# Patient Record
Sex: MALE | Race: WHITE | Employment: OTHER | URBAN - METROPOLITAN AREA
[De-identification: names, ages, dates, MRNs, and addresses within clinical notes are randomized per-mention and may not be internally consistent; named-entity substitution may affect disease eponyms.]

---

## 2017-08-23 ENCOUNTER — OFFICE VISIT (OUTPATIENT)
Dept: INTERNAL MEDICINE CLINIC | Age: 57
End: 2017-08-23
Payer: COMMERCIAL

## 2017-08-23 ENCOUNTER — HOSPITAL ENCOUNTER (OUTPATIENT)
Age: 57
Setting detail: SPECIMEN
Discharge: HOME | End: 2017-08-23
Payer: COMMERCIAL

## 2017-08-23 VITALS
BODY MASS INDEX: 32.44 KG/M2 | WEIGHT: 219 LBS | SYSTOLIC BLOOD PRESSURE: 136 MMHG | DIASTOLIC BLOOD PRESSURE: 84 MMHG | HEIGHT: 69 IN

## 2017-08-23 DIAGNOSIS — L30.9 ECZEMA OF BOTH HANDS: ICD-10-CM

## 2017-08-23 LAB
-: ABNORMAL
ABSOLUTE EOS #: 0.2 K/UL (ref 0–0.4)
ABSOLUTE LYMPH #: 1.7 K/UL (ref 1–4.8)
ABSOLUTE MONO #: 0.9 K/UL (ref 0.1–1.2)
ALBUMIN SERPL-MCNC: 4.9 G/DL (ref 3.5–5.2)
ALBUMIN/GLOBULIN RATIO: 1.8 (ref 1–2.5)
ALP BLD-CCNC: 51 U/L (ref 40–129)
ALT SERPL-CCNC: 15 U/L (ref 5–41)
AMORPHOUS: ABNORMAL
ANION GAP SERPL CALCULATED.3IONS-SCNC: 20 MMOL/L (ref 9–17)
AST SERPL-CCNC: 20 U/L
BACTERIA: ABNORMAL
BASOPHILS # BLD: 1 %
BASOPHILS ABSOLUTE: 0 K/UL (ref 0–0.2)
BILIRUB SERPL-MCNC: 0.5 MG/DL (ref 0.3–1.2)
BILIRUBIN URINE: NEGATIVE
BUN BLDV-MCNC: 13 MG/DL (ref 6–20)
BUN/CREAT BLD: ABNORMAL (ref 9–20)
CALCIUM SERPL-MCNC: 9.7 MG/DL (ref 8.6–10.4)
CASTS UA: ABNORMAL /LPF (ref 0–8)
CHLORIDE BLD-SCNC: 102 MMOL/L (ref 98–107)
CO2: 20 MMOL/L (ref 20–31)
COLOR: YELLOW
CREAT SERPL-MCNC: 0.73 MG/DL (ref 0.7–1.2)
CRYSTALS, UA: ABNORMAL /HPF
DIFFERENTIAL TYPE: NORMAL
EOSINOPHILS RELATIVE PERCENT: 2 %
EPITHELIAL CELLS UA: ABNORMAL /HPF (ref 0–5)
GFR AFRICAN AMERICAN: >60 ML/MIN
GFR NON-AFRICAN AMERICAN: >60 ML/MIN
GFR SERPL CREATININE-BSD FRML MDRD: ABNORMAL ML/MIN/{1.73_M2}
GFR SERPL CREATININE-BSD FRML MDRD: ABNORMAL ML/MIN/{1.73_M2}
GLUCOSE BLD-MCNC: 92 MG/DL (ref 70–99)
GLUCOSE URINE: NEGATIVE
HCT VFR BLD CALC: 46.1 % (ref 41–53)
HEMOGLOBIN: 15.9 G/DL (ref 13.5–17.5)
KETONES, URINE: NEGATIVE
LEUKOCYTE ESTERASE, URINE: NEGATIVE
LYMPHOCYTES # BLD: 27 %
MCH RBC QN AUTO: 32.7 PG (ref 26–34)
MCHC RBC AUTO-ENTMCNC: 34.6 G/DL (ref 31–37)
MCV RBC AUTO: 94.5 FL (ref 80–100)
MONOCYTES # BLD: 14 %
MUCUS: ABNORMAL
NITRITE, URINE: NEGATIVE
OTHER OBSERVATIONS UA: ABNORMAL
PDW BLD-RTO: 14.4 % (ref 12.5–15.4)
PH UA: 5.5 (ref 5–8)
PLATELET # BLD: 257 K/UL (ref 140–450)
PLATELET ESTIMATE: NORMAL
PMV BLD AUTO: 8.8 FL (ref 6–12)
POTASSIUM SERPL-SCNC: 4.3 MMOL/L (ref 3.7–5.3)
PROTEIN UA: NEGATIVE
RBC # BLD: 4.88 M/UL (ref 4.5–5.9)
RBC # BLD: NORMAL 10*6/UL
RBC UA: ABNORMAL /HPF (ref 0–4)
RENAL EPITHELIAL, UA: ABNORMAL /HPF
SEDIMENTATION RATE, ERYTHROCYTE: 1 MM (ref 0–10)
SEG NEUTROPHILS: 56 %
SEGMENTED NEUTROPHILS ABSOLUTE COUNT: 3.6 K/UL (ref 1.8–7.7)
SODIUM BLD-SCNC: 142 MMOL/L (ref 135–144)
SPECIFIC GRAVITY UA: 1.02 (ref 1–1.03)
TOTAL PROTEIN: 7.6 G/DL (ref 6.4–8.3)
TRICHOMONAS: ABNORMAL
TSH SERPL DL<=0.05 MIU/L-ACNC: 0.79 MIU/L (ref 0.3–5)
TURBIDITY: ABNORMAL
URINE HGB: NEGATIVE
UROBILINOGEN, URINE: NORMAL
WBC # BLD: 6.3 K/UL (ref 3.5–11)
WBC # BLD: NORMAL 10*3/UL
WBC UA: ABNORMAL /HPF (ref 0–5)
YEAST: ABNORMAL

## 2017-08-23 PROCEDURE — 99214 OFFICE O/P EST MOD 30 MIN: CPT | Performed by: INTERNAL MEDICINE

## 2017-08-23 RX ORDER — CLOBETASOL PROPIONATE 0.5 MG/G
AEROSOL, FOAM TOPICAL
Qty: 1 CAN | Refills: 3 | Status: SHIPPED | OUTPATIENT
Start: 2017-08-23 | End: 2018-02-27 | Stop reason: ALTCHOICE

## 2017-08-23 RX ORDER — PREDNISONE 20 MG/1
20 TABLET ORAL DAILY
Qty: 10 TABLET | Refills: 0 | Status: SHIPPED | OUTPATIENT
Start: 2017-08-23 | End: 2017-09-13 | Stop reason: SDUPTHER

## 2017-08-23 ASSESSMENT — PATIENT HEALTH QUESTIONNAIRE - PHQ9
SUM OF ALL RESPONSES TO PHQ9 QUESTIONS 1 & 2: 0
2. FEELING DOWN, DEPRESSED OR HOPELESS: 0
1. LITTLE INTEREST OR PLEASURE IN DOING THINGS: 0
SUM OF ALL RESPONSES TO PHQ QUESTIONS 1-9: 0

## 2017-08-23 ASSESSMENT — ENCOUNTER SYMPTOMS
EYES NEGATIVE: 1
RESPIRATORY NEGATIVE: 1

## 2017-09-14 RX ORDER — PREDNISONE 20 MG/1
20 TABLET ORAL DAILY
Qty: 10 TABLET | Refills: 2 | Status: SHIPPED | OUTPATIENT
Start: 2017-09-14 | End: 2017-09-24

## 2017-10-03 ENCOUNTER — TELEPHONE (OUTPATIENT)
Dept: INTERNAL MEDICINE CLINIC | Age: 57
End: 2017-10-03

## 2017-10-03 ENCOUNTER — OFFICE VISIT (OUTPATIENT)
Dept: INTERNAL MEDICINE CLINIC | Age: 57
End: 2017-10-03
Payer: COMMERCIAL

## 2017-10-03 VITALS
WEIGHT: 233 LBS | SYSTOLIC BLOOD PRESSURE: 116 MMHG | BODY MASS INDEX: 34.51 KG/M2 | DIASTOLIC BLOOD PRESSURE: 80 MMHG | HEIGHT: 69 IN

## 2017-10-03 DIAGNOSIS — Z12.11 ENCOUNTER FOR SCREENING COLONOSCOPY: ICD-10-CM

## 2017-10-03 DIAGNOSIS — L30.9 ECZEMA OF BOTH HANDS: Primary | ICD-10-CM

## 2017-10-03 PROCEDURE — 99214 OFFICE O/P EST MOD 30 MIN: CPT | Performed by: INTERNAL MEDICINE

## 2017-10-03 PROCEDURE — G8417 CALC BMI ABV UP PARAM F/U: HCPCS | Performed by: INTERNAL MEDICINE

## 2017-10-03 PROCEDURE — G8427 DOCREV CUR MEDS BY ELIG CLIN: HCPCS | Performed by: INTERNAL MEDICINE

## 2017-10-03 PROCEDURE — 4004F PT TOBACCO SCREEN RCVD TLK: CPT | Performed by: INTERNAL MEDICINE

## 2017-10-03 PROCEDURE — G8484 FLU IMMUNIZE NO ADMIN: HCPCS | Performed by: INTERNAL MEDICINE

## 2017-10-03 PROCEDURE — 3017F COLORECTAL CA SCREEN DOC REV: CPT | Performed by: INTERNAL MEDICINE

## 2017-10-03 RX ORDER — PREDNISONE 20 MG/1
TABLET ORAL
COMMUNITY
Start: 2017-09-25 | End: 2018-02-27 | Stop reason: ALTCHOICE

## 2017-10-03 ASSESSMENT — ENCOUNTER SYMPTOMS
RESPIRATORY NEGATIVE: 1
EYES NEGATIVE: 1

## 2017-10-03 NOTE — MR AVS SNAPSHOT
After Visit Summary             Alisa Wilson   10/3/2017 3:30 PM   Office Visit    Description:  Male : 1960   Provider:  Aaliyah Christian MD   Department:  [facility]              Your Follow-Up and Future Appointments         Below is a list of your follow-up and future appointments. This may not be a complete list as you may have made appointments directly with providers that we are not aware of or your providers may have made some for you. Please call your providers to confirm appointments. It is important to keep your appointments. Please bring your current insurance card, photo ID, co-pay, and all medication bottles to your appointment. If self-pay, payment is expected at the time of service. Your To-Do List     Follow-Up    Return for Follow Up. Information from Your Visit        Department     Name Address Phone Fax    [address] [address] [phone]      You Were Seen for:         Comments    Eczema of both hands   [[id number]]         Vital Signs     Blood Pressure Height Weight Body Mass Index Smoking Status       116/80 5' 9.29\" (1.76 m) 233 lb (105.7 kg) 34.12 kg/m2 Current Every Day Smoker       Additional Information about your Body Mass Index (BMI)           Your BMI as listed above is considered obese (30 or more). BMI is an estimate of body fat, calculated from your height and weight. The higher your BMI, the greater your risk of heart disease, high blood pressure, type 2 diabetes, stroke, gallstones, arthritis, sleep apnea, and certain cancers. BMI is not perfect. It may overestimate body fat in athletes and people who are more muscular. Even a small weight loss (between 5 and 10 percent of your current weight) by decreasing your calorie intake and becoming more physically active will help lower your risk of developing or worsening diseases associated with obesity. Learn more at: [employer]             Medications and Orders      Your Current Medications Are              predniSONE (DELTASONE) 20 MG tablet     clobetasol (OLUX) 0.05 % foam Apply topically 2 times daily. Allergies           No Known Allergies      We Ordered/Performed the following           External Referral To Dermatology     Comments: The patient can be scheduled with any member of the group, including the provider with the first available appointments. Kaela Crook MD, [facility] [place]*     Scheduling Instructions:    [facility]- Renée Brantley, [address]  [place], [address] [place] [address]  [phone]    Comments: The patient can be scheduled with any member of the group, including the provider with the first available appointments. Additional Information        Basic Information     Date Of Birth Sex Race Ethnicity Preferred Language Preferred Written Language    1960 Male White Non-/Non  English English      Problem List as of 10/3/2017  Date Reviewed: 10/3/2017                Eczema of both hands      Preventive Care        Date Due    Hepatitis C screening is recommended for all adults regardless of risk factors born between [facility] at least once (lifetime) who have never been tested. 1960    HIV screening is recommended for all people regardless of risk factors  aged 15-65 years at least once (lifetime) who have never been HIV tested.  8/7/1973    Tetanus Combination Vaccine (1 - Tdap) 8/7/1977    Pneumococcal Vaccine - Pneumovax for adults aged 19-64 years with: chronic heart disease, chronic lung disease, diabetes mellitus, alcoholism, chronic liver disease, or cigarette smoking. (1 of 1 - PPSV23) 8/7/1977    Cholesterol Screening 8/7/1998    Colonoscopy 8/7/2008    Yearly Flu Vaccine (1) 9/1/2017            MyChart Signup myeasydocs allows you to send messages to your doctor, view your test results, renew your prescriptions, schedule appointments, view visit notes, and more. How Do I Sign Up? 1. In your Internet browser, go to [email or web address]  2. Click on the Sign Up Now link in the Sign In box. You will see the New Member Sign Up page. 3. Enter your myeasydocs Access Code exactly as it appears below. You will not need to use this code after youve completed the sign-up process. If you do not sign up before the expiration date, you must request a new code. myeasydocs Access Code: [email or web address]  Expires: 10/22/2017  2:02 PM    4. Enter your Social Security Number ([id number]) and Date of Birth (mm/dd/yyyy) as indicated and click Submit. You will be taken to the next sign-up page. 5. Create a myeasydocs ID. This will be your myeasydocs login ID and cannot be changed, so think of one that is secure and easy to remember. 6. Create a myeasydocs password. You can change your password at any time. 7. Enter your Password Reset Question and Answer. This can be used at a later time if you forget your password. 8. Enter your e-mail address. You will receive e-mail notification when new information is available in [address] 9. Click Sign Up. You can now view your medical record. Additional Information  If you have questions, please contact the physician practice where you receive care. Remember, myeasydocs is NOT to be used for urgent needs. For medical emergencies, dial 911. For questions regarding your myeasydocs account call [phone]. If you have a clinical question, please call your doctor's office.

## 2017-10-03 NOTE — TELEPHONE ENCOUNTER
LM on pts phone to find out where pt will be going for external dermatology referral placed by Dr. Jefferson Casarez.

## 2017-10-04 NOTE — TELEPHONE ENCOUNTER
Pt returned my call & informed me Dr. Jovanny Marshall referred him to Dr. Jose Shultz. Referral updated.

## 2018-02-27 ENCOUNTER — TELEPHONE (OUTPATIENT)
Dept: INTERNAL MEDICINE CLINIC | Age: 58
End: 2018-02-27

## 2018-02-27 ENCOUNTER — HOSPITAL ENCOUNTER (OUTPATIENT)
Facility: CLINIC | Age: 58
Discharge: HOME | End: 2018-03-01
Payer: COMMERCIAL

## 2018-02-27 ENCOUNTER — OFFICE VISIT (OUTPATIENT)
Dept: INTERNAL MEDICINE CLINIC | Age: 58
End: 2018-02-27
Payer: COMMERCIAL

## 2018-02-27 ENCOUNTER — HOSPITAL ENCOUNTER (OUTPATIENT)
Dept: GENERAL RADIOLOGY | Facility: CLINIC | Age: 58
Discharge: HOME | End: 2018-03-01
Payer: COMMERCIAL

## 2018-02-27 VITALS
HEART RATE: 71 BPM | OXYGEN SATURATION: 97 % | WEIGHT: 233.03 LBS | BODY MASS INDEX: 34.51 KG/M2 | SYSTOLIC BLOOD PRESSURE: 162 MMHG | HEIGHT: 69 IN | DIASTOLIC BLOOD PRESSURE: 108 MMHG

## 2018-02-27 DIAGNOSIS — R07.89 LEFT-SIDED CHEST WALL PAIN: Primary | ICD-10-CM

## 2018-02-27 DIAGNOSIS — R07.89 LEFT-SIDED CHEST WALL PAIN: ICD-10-CM

## 2018-02-27 PROCEDURE — 99213 OFFICE O/P EST LOW 20 MIN: CPT | Performed by: INTERNAL MEDICINE

## 2018-02-27 PROCEDURE — G8427 DOCREV CUR MEDS BY ELIG CLIN: HCPCS | Performed by: INTERNAL MEDICINE

## 2018-02-27 PROCEDURE — G8484 FLU IMMUNIZE NO ADMIN: HCPCS | Performed by: INTERNAL MEDICINE

## 2018-02-27 PROCEDURE — 3017F COLORECTAL CA SCREEN DOC REV: CPT | Performed by: INTERNAL MEDICINE

## 2018-02-27 PROCEDURE — 71046 X-RAY EXAM CHEST 2 VIEWS: CPT

## 2018-02-27 PROCEDURE — G8417 CALC BMI ABV UP PARAM F/U: HCPCS | Performed by: INTERNAL MEDICINE

## 2018-02-27 PROCEDURE — 4004F PT TOBACCO SCREEN RCVD TLK: CPT | Performed by: INTERNAL MEDICINE

## 2018-02-27 RX ORDER — CLOBETASOL PROPIONATE 0.5 MG/G
OINTMENT TOPICAL 2 TIMES DAILY
COMMUNITY
End: 2019-01-15 | Stop reason: ALTCHOICE

## 2018-02-27 NOTE — PROGRESS NOTES
Subjective:      Patient ID: Ammy Fernández is a 59 y.o. male. Visit Information    Have you changed or started any medications since your last visit including any over-the-counter medicines, vitamins, or herbal medicines? no   Are you having any side effects from any of your medications? -  no  Have you stopped taking any of your medications? Is so, why? -  no    Have you seen any other physician or provider since your last visit? No  Have you had any other diagnostic tests since your last visit? No  Have you been seen in the emergency room and/or had an admission to a hospital since we last saw you? No  Have you had your routine dental cleaning in the past 6 months? no    Have you activated your [employer] account? If not, what are your barriers? No:      Patient Care Team:  Raisa Camacho MD as PCP - General (Internal Medicine)    Medical History Review  Past Medical, Family, and Social History reviewed and does contribute to the patient presenting condition    Health Maintenance   Topic Date Due    Hepatitis C screen  1960    HIV screen  08/07/1973    DTaP/Tdap/Td vaccine (1 - Tdap) 08/07/1977    Pneumococcal med risk (1 of 1 - PPSV23) 08/07/1977    Lipid screen  08/07/1998    Shingles Vaccine (1 of 2 - 2 Dose Series) 08/07/2008    Colon cancer screen colonoscopy  08/07/2008    Smoker: low dose lung CT screening  08/07/2013    Flu vaccine (1) 09/01/2017       HPI   Chief Complaint   Patient presents with    Chest Pain     pt states he fell about 2 weeks ago and hit the left side of his chest on the tailgate of his truck. Still c/o pain in the left side of his chest. Will radiate around to left side of back and under left ar. .     Pt with fall   Left chest wall pain   Not improving since 1 week   Will do CXR for rib fracture hemothorax    Review of Systems   History reviewed. No pertinent past medical history.   Social History   Substance Use Topics    Smoking status: Current Every Day Smoker

## 2019-01-15 ENCOUNTER — OFFICE VISIT (OUTPATIENT)
Dept: INTERNAL MEDICINE CLINIC | Age: 59
End: 2019-01-15
Payer: COMMERCIAL

## 2019-01-15 VITALS
SYSTOLIC BLOOD PRESSURE: 172 MMHG | WEIGHT: 233.03 LBS | DIASTOLIC BLOOD PRESSURE: 92 MMHG | HEIGHT: 69 IN | BODY MASS INDEX: 34.51 KG/M2

## 2019-01-15 DIAGNOSIS — I10 HYPERTENSION, ESSENTIAL: ICD-10-CM

## 2019-01-15 DIAGNOSIS — H61.23 IMPACTED CERUMEN OF BOTH EARS: Primary | ICD-10-CM

## 2019-01-15 DIAGNOSIS — Z13.220 SCREENING FOR HYPERLIPIDEMIA: ICD-10-CM

## 2019-01-15 DIAGNOSIS — Z12.11 SCREEN FOR COLON CANCER: ICD-10-CM

## 2019-01-15 PROCEDURE — G8417 CALC BMI ABV UP PARAM F/U: HCPCS | Performed by: INTERNAL MEDICINE

## 2019-01-15 PROCEDURE — 99214 OFFICE O/P EST MOD 30 MIN: CPT | Performed by: INTERNAL MEDICINE

## 2019-01-15 PROCEDURE — 3017F COLORECTAL CA SCREEN DOC REV: CPT | Performed by: INTERNAL MEDICINE

## 2019-01-15 PROCEDURE — G8427 DOCREV CUR MEDS BY ELIG CLIN: HCPCS | Performed by: INTERNAL MEDICINE

## 2019-01-15 PROCEDURE — 4004F PT TOBACCO SCREEN RCVD TLK: CPT | Performed by: INTERNAL MEDICINE

## 2019-01-15 PROCEDURE — G8484 FLU IMMUNIZE NO ADMIN: HCPCS | Performed by: INTERNAL MEDICINE

## 2019-01-15 RX ORDER — LOSARTAN POTASSIUM 50 MG/1
50 TABLET ORAL DAILY
Qty: 30 TABLET | Refills: 1 | Status: SHIPPED | OUTPATIENT
Start: 2019-01-15 | End: 2019-04-10 | Stop reason: SDUPTHER

## 2019-01-15 ASSESSMENT — PATIENT HEALTH QUESTIONNAIRE - PHQ9
SUM OF ALL RESPONSES TO PHQ QUESTIONS 1-9: 0
SUM OF ALL RESPONSES TO PHQ QUESTIONS 1-9: 0
1. LITTLE INTEREST OR PLEASURE IN DOING THINGS: 0
2. FEELING DOWN, DEPRESSED OR HOPELESS: 0
SUM OF ALL RESPONSES TO PHQ9 QUESTIONS 1 & 2: 0

## 2019-01-15 ASSESSMENT — ENCOUNTER SYMPTOMS
EYES NEGATIVE: 1
RESPIRATORY NEGATIVE: 1

## 2019-02-04 ENCOUNTER — HOSPITAL ENCOUNTER (OUTPATIENT)
Age: 59
Setting detail: SPECIMEN
Discharge: HOME | End: 2019-02-04
Payer: COMMERCIAL

## 2019-02-04 DIAGNOSIS — I10 HYPERTENSION, ESSENTIAL: ICD-10-CM

## 2019-02-04 LAB
-: ABNORMAL
ABSOLUTE EOS #: 0.14 K/UL (ref 0–0.44)
ABSOLUTE IMMATURE GRANULOCYTE: <0.03 K/UL (ref 0–0.3)
ABSOLUTE LYMPH #: 1.32 K/UL (ref 1.1–3.7)
ABSOLUTE MONO #: 0.8 K/UL (ref 0.1–1.2)
ALBUMIN SERPL-MCNC: 4.5 G/DL (ref 3.5–5.2)
ALBUMIN/GLOBULIN RATIO: 1.6 (ref 1–2.5)
ALP BLD-CCNC: 53 U/L (ref 40–129)
ALT SERPL-CCNC: 17 U/L (ref 5–41)
AMORPHOUS: ABNORMAL
ANION GAP SERPL CALCULATED.3IONS-SCNC: 12 MMOL/L (ref 9–17)
AST SERPL-CCNC: 16 U/L
BACTERIA: ABNORMAL
BASOPHILS # BLD: 1 % (ref 0–2)
BASOPHILS ABSOLUTE: 0.08 K/UL (ref 0–0.2)
BILIRUB SERPL-MCNC: 0.4 MG/DL (ref 0.3–1.2)
BILIRUBIN URINE: NEGATIVE
BUN BLDV-MCNC: 17 MG/DL (ref 8–23)
BUN/CREAT BLD: ABNORMAL (ref 9–20)
CALCIUM SERPL-MCNC: 9.8 MG/DL (ref 8.6–10.4)
CASTS UA: ABNORMAL /LPF (ref 0–8)
CHLORIDE BLD-SCNC: 105 MMOL/L (ref 98–107)
CHOLESTEROL/HDL RATIO: 3.7
CHOLESTEROL: 232 MG/DL
CO2: 24 MMOL/L (ref 20–31)
COLOR: YELLOW
CREAT SERPL-MCNC: 0.79 MG/DL (ref 0.7–1.2)
CRYSTALS, UA: ABNORMAL /HPF
DIFFERENTIAL TYPE: ABNORMAL
EOSINOPHILS RELATIVE PERCENT: 3 % (ref 1–4)
EPITHELIAL CELLS UA: ABNORMAL /HPF (ref 0–5)
GFR AFRICAN AMERICAN: >60 ML/MIN
GFR NON-AFRICAN AMERICAN: >60 ML/MIN
GFR SERPL CREATININE-BSD FRML MDRD: ABNORMAL ML/MIN/{1.73_M2}
GFR SERPL CREATININE-BSD FRML MDRD: ABNORMAL ML/MIN/{1.73_M2}
GLUCOSE BLD-MCNC: 103 MG/DL (ref 70–99)
GLUCOSE URINE: NEGATIVE
HCT VFR BLD CALC: 43 % (ref 40.7–50.3)
HDLC SERPL-MCNC: 63 MG/DL
HEMOGLOBIN: 14.7 G/DL (ref 13–17)
IMMATURE GRANULOCYTES: 0 %
KETONES, URINE: NEGATIVE
LDL CHOLESTEROL: 156 MG/DL (ref 0–130)
LEUKOCYTE ESTERASE, URINE: NEGATIVE
LYMPHOCYTES # BLD: 23 % (ref 24–43)
MCH RBC QN AUTO: 32.7 PG (ref 25.2–33.5)
MCHC RBC AUTO-ENTMCNC: 34.2 G/DL (ref 28.4–34.8)
MCV RBC AUTO: 95.6 FL (ref 82.6–102.9)
MONOCYTES # BLD: 14 % (ref 3–12)
MUCUS: ABNORMAL
NITRITE, URINE: NEGATIVE
NRBC AUTOMATED: 0 PER 100 WBC
OTHER OBSERVATIONS UA: ABNORMAL
PDW BLD-RTO: 13.3 % (ref 11.8–14.4)
PH UA: 6 (ref 5–8)
PLATELET # BLD: 269 K/UL (ref 138–453)
PLATELET ESTIMATE: ABNORMAL
PMV BLD AUTO: 9.9 FL (ref 8.1–13.5)
POTASSIUM SERPL-SCNC: 4.5 MMOL/L (ref 3.7–5.3)
PROTEIN UA: NEGATIVE
RBC # BLD: 4.5 M/UL (ref 4.21–5.77)
RBC # BLD: ABNORMAL 10*6/UL
RBC UA: ABNORMAL /HPF (ref 0–4)
RENAL EPITHELIAL, UA: ABNORMAL /HPF
SEDIMENTATION RATE, ERYTHROCYTE: 5 MM (ref 0–10)
SEG NEUTROPHILS: 59 % (ref 36–65)
SEGMENTED NEUTROPHILS ABSOLUTE COUNT: 3.36 K/UL (ref 1.5–8.1)
SODIUM BLD-SCNC: 141 MMOL/L (ref 135–144)
SPECIFIC GRAVITY UA: 1.02 (ref 1–1.03)
TOTAL PROTEIN: 7.4 G/DL (ref 6.4–8.3)
TRICHOMONAS: ABNORMAL
TRIGL SERPL-MCNC: 64 MG/DL
TSH SERPL DL<=0.05 MIU/L-ACNC: 1.54 MIU/L (ref 0.3–5)
TURBIDITY: ABNORMAL
URINE HGB: NEGATIVE
UROBILINOGEN, URINE: NORMAL
VLDLC SERPL CALC-MCNC: ABNORMAL MG/DL (ref 1–30)
WBC # BLD: 5.7 K/UL (ref 3.5–11.3)
WBC # BLD: ABNORMAL 10*3/UL
WBC UA: ABNORMAL /HPF (ref 0–5)
YEAST: ABNORMAL

## 2019-03-27 ENCOUNTER — OFFICE VISIT (OUTPATIENT)
Dept: INTERNAL MEDICINE CLINIC | Age: 59
End: 2019-03-27
Payer: COMMERCIAL

## 2019-03-27 VITALS
SYSTOLIC BLOOD PRESSURE: 124 MMHG | OXYGEN SATURATION: 98 % | HEART RATE: 104 BPM | DIASTOLIC BLOOD PRESSURE: 82 MMHG | HEIGHT: 69 IN | WEIGHT: 232 LBS | BODY MASS INDEX: 34.36 KG/M2

## 2019-03-27 DIAGNOSIS — R10.13 EPIGASTRIC ABDOMINAL PAIN: Primary | ICD-10-CM

## 2019-03-27 DIAGNOSIS — K08.89 PAIN, DENTAL: ICD-10-CM

## 2019-03-27 DIAGNOSIS — R13.10 ODYNOPHAGIA: ICD-10-CM

## 2019-03-27 PROCEDURE — G8427 DOCREV CUR MEDS BY ELIG CLIN: HCPCS | Performed by: PHYSICIAN ASSISTANT

## 2019-03-27 PROCEDURE — 4004F PT TOBACCO SCREEN RCVD TLK: CPT | Performed by: PHYSICIAN ASSISTANT

## 2019-03-27 PROCEDURE — G8484 FLU IMMUNIZE NO ADMIN: HCPCS | Performed by: PHYSICIAN ASSISTANT

## 2019-03-27 PROCEDURE — 3017F COLORECTAL CA SCREEN DOC REV: CPT | Performed by: PHYSICIAN ASSISTANT

## 2019-03-27 PROCEDURE — G8417 CALC BMI ABV UP PARAM F/U: HCPCS | Performed by: PHYSICIAN ASSISTANT

## 2019-03-27 PROCEDURE — 99214 OFFICE O/P EST MOD 30 MIN: CPT | Performed by: PHYSICIAN ASSISTANT

## 2019-03-27 RX ORDER — AMOXICILLIN 875 MG/1
875 TABLET, COATED ORAL 2 TIMES DAILY
Qty: 14 TABLET | Refills: 0 | Status: SHIPPED | OUTPATIENT
Start: 2019-03-27 | End: 2019-04-03

## 2019-03-27 RX ORDER — OMEPRAZOLE 20 MG/1
20 CAPSULE, DELAYED RELEASE ORAL
Qty: 60 CAPSULE | Refills: 0 | Status: SHIPPED | OUTPATIENT
Start: 2019-03-27 | End: 2022-06-02

## 2019-03-27 ASSESSMENT — ENCOUNTER SYMPTOMS
COUGH: 0
EYE PAIN: 0
CONSTIPATION: 0
CHEST TIGHTNESS: 0
EYE ITCHING: 0
NAUSEA: 1
TROUBLE SWALLOWING: 1
BLOOD IN STOOL: 0
COLOR CHANGE: 0
BACK PAIN: 1
ABDOMINAL PAIN: 1
DIARRHEA: 0
SHORTNESS OF BREATH: 0
SINUS PAIN: 0
VOMITING: 1
RHINORRHEA: 0
EYE DISCHARGE: 0
SORE THROAT: 0

## 2019-04-04 ENCOUNTER — HOSPITAL ENCOUNTER (OUTPATIENT)
Age: 59
Setting detail: SPECIMEN
Discharge: HOME | End: 2019-04-04
Payer: COMMERCIAL

## 2019-04-04 DIAGNOSIS — R10.13 EPIGASTRIC ABDOMINAL PAIN: ICD-10-CM

## 2019-04-04 LAB
ABSOLUTE EOS #: 0.12 K/UL (ref 0–0.44)
ABSOLUTE IMMATURE GRANULOCYTE: <0.03 K/UL (ref 0–0.3)
ABSOLUTE LYMPH #: 1.32 K/UL (ref 1.1–3.7)
ABSOLUTE MONO #: 0.82 K/UL (ref 0.1–1.2)
ALBUMIN SERPL-MCNC: 4.6 G/DL (ref 3.5–5.2)
ALBUMIN/GLOBULIN RATIO: 1.6 (ref 1–2.5)
ALP BLD-CCNC: 58 U/L (ref 40–129)
ALT SERPL-CCNC: 20 U/L (ref 5–41)
AST SERPL-CCNC: 20 U/L
BASOPHILS # BLD: 1 % (ref 0–2)
BASOPHILS ABSOLUTE: 0.07 K/UL (ref 0–0.2)
BILIRUB SERPL-MCNC: 0.4 MG/DL (ref 0.3–1.2)
BILIRUBIN DIRECT: 0.09 MG/DL
BILIRUBIN, INDIRECT: 0.31 MG/DL (ref 0–1)
DATE, STOOL #1: NORMAL
DATE, STOOL #2: NORMAL
DATE, STOOL #3: NORMAL
DIFFERENTIAL TYPE: ABNORMAL
EOSINOPHILS RELATIVE PERCENT: 2 % (ref 1–4)
GLOBULIN: NORMAL G/DL (ref 1.5–3.8)
HCT VFR BLD CALC: 41.6 % (ref 40.7–50.3)
HEMOCCULT SP1 STL QL: NEGATIVE
HEMOCCULT SP2 STL QL: NORMAL
HEMOCCULT SP3 STL QL: NORMAL
HEMOGLOBIN: 14.1 G/DL (ref 13–17)
IMMATURE GRANULOCYTES: 0 %
LIPASE: 51 U/L (ref 13–60)
LYMPHOCYTES # BLD: 23 % (ref 24–43)
MCH RBC QN AUTO: 32.5 PG (ref 25.2–33.5)
MCHC RBC AUTO-ENTMCNC: 33.9 G/DL (ref 28.4–34.8)
MCV RBC AUTO: 95.9 FL (ref 82.6–102.9)
MONOCYTES # BLD: 14 % (ref 3–12)
NRBC AUTOMATED: 0 PER 100 WBC
PDW BLD-RTO: 13 % (ref 11.8–14.4)
PLATELET # BLD: 324 K/UL (ref 138–453)
PLATELET ESTIMATE: ABNORMAL
PMV BLD AUTO: 10.1 FL (ref 8.1–13.5)
RBC # BLD: 4.34 M/UL (ref 4.21–5.77)
RBC # BLD: ABNORMAL 10*6/UL
SEG NEUTROPHILS: 60 % (ref 36–65)
SEGMENTED NEUTROPHILS ABSOLUTE COUNT: 3.49 K/UL (ref 1.5–8.1)
TIME, STOOL #1: NORMAL
TIME, STOOL #2: NORMAL
TIME, STOOL #3: NORMAL
TOTAL PROTEIN: 7.4 G/DL (ref 6.4–8.3)
WBC # BLD: 5.8 K/UL (ref 3.5–11.3)
WBC # BLD: ABNORMAL 10*3/UL

## 2019-04-10 ENCOUNTER — OFFICE VISIT (OUTPATIENT)
Dept: INTERNAL MEDICINE CLINIC | Age: 59
End: 2019-04-10
Payer: COMMERCIAL

## 2019-04-10 VITALS
SYSTOLIC BLOOD PRESSURE: 138 MMHG | OXYGEN SATURATION: 98 % | WEIGHT: 237 LBS | BODY MASS INDEX: 35.1 KG/M2 | DIASTOLIC BLOOD PRESSURE: 82 MMHG | HEIGHT: 69 IN | HEART RATE: 91 BPM

## 2019-04-10 DIAGNOSIS — R13.10 ODYNOPHAGIA: ICD-10-CM

## 2019-04-10 DIAGNOSIS — K08.89 PAIN, DENTAL: ICD-10-CM

## 2019-04-10 DIAGNOSIS — R10.13 EPIGASTRIC ABDOMINAL PAIN: Primary | ICD-10-CM

## 2019-04-10 PROCEDURE — G8427 DOCREV CUR MEDS BY ELIG CLIN: HCPCS | Performed by: PHYSICIAN ASSISTANT

## 2019-04-10 PROCEDURE — 4004F PT TOBACCO SCREEN RCVD TLK: CPT | Performed by: PHYSICIAN ASSISTANT

## 2019-04-10 PROCEDURE — G8417 CALC BMI ABV UP PARAM F/U: HCPCS | Performed by: PHYSICIAN ASSISTANT

## 2019-04-10 PROCEDURE — 3017F COLORECTAL CA SCREEN DOC REV: CPT | Performed by: PHYSICIAN ASSISTANT

## 2019-04-10 PROCEDURE — 99213 OFFICE O/P EST LOW 20 MIN: CPT | Performed by: PHYSICIAN ASSISTANT

## 2019-04-10 RX ORDER — LOSARTAN POTASSIUM 50 MG/1
50 TABLET ORAL DAILY
Qty: 30 TABLET | Refills: 1 | Status: SHIPPED | OUTPATIENT
Start: 2019-04-10 | End: 2019-07-08 | Stop reason: SDUPTHER

## 2019-04-10 ASSESSMENT — ENCOUNTER SYMPTOMS
RHINORRHEA: 0
TROUBLE SWALLOWING: 0
BACK PAIN: 0
DIARRHEA: 0
COLOR CHANGE: 0
ABDOMINAL PAIN: 0
SINUS PAIN: 0
EYE ITCHING: 0
VOMITING: 0
EYE PAIN: 0
NAUSEA: 0
CHEST TIGHTNESS: 0
SHORTNESS OF BREATH: 0
SORE THROAT: 0
CONSTIPATION: 0
BLOOD IN STOOL: 0
COUGH: 0
EYE DISCHARGE: 0

## 2019-04-10 NOTE — PROGRESS NOTES
Visit Information    Have you changed or started any medications since your last visit including any over-the-counter medicines, vitamins, or herbal medicines? no   Are you having any side effects from any of your medications? -  no  Have you stopped taking any of your medications? Is so, why? -  no    Have you seen any other physician or provider since your last visit? No  Have you had any other diagnostic tests since your last visit? No  Have you been seen in the emergency room and/or had an admission to a hospital since we last saw you? No  Have you had your routine dental cleaning in the past 6 months? no    Have you activated your [employer] account? If not, what are your barriers?  No:      Patient Care Team:  Danial Cortez MD as PCP - General (Internal Medicine)    Medical History Review  Past Medical, Family, and Social History reviewed and does not contribute to the patient presenting condition    Health Maintenance   Topic Date Due    Hepatitis C screen  1960    Pneumococcal 0-64 years Vaccine (1 of 1 - PPSV23) 08/07/1964    HIV screen  08/07/1973    Diabetes screen  08/07/1998    Low dose CT lung screening  08/07/2013    DTaP/Tdap/Td vaccine (1 - Tdap) 01/09/2020 (Originally 8/7/1977)    Shingles Vaccine (1 of 2) 02/11/2020 (Originally 8/7/2008)    Flu vaccine (Season Ended) 09/01/2019    Potassium monitoring  02/04/2020    Creatinine monitoring  02/04/2020    Colon Cancer Screen FIT/FOBT  04/04/2020    Lipid screen  02/04/2024     Chief Complaint   Patient presents with    Abdominal Pain     2 week follow up is feeling better    Results     had labs done

## 2019-04-10 NOTE — PROGRESS NOTES
[facility]  [facility]  [address]  [address] [address]  Dept: [phone]  Dept Fax: [phone]    OfficeProgress/Follow Up Note  Date of patient's visit: 4/10/2019  Patient's Name:  Santo De La Torre YOB: 1960            Patient Care Team:  Mary Erickson MD as PCP - General (Internal Medicine)    REASON FOR VISIT:  Same day visit    HISTORY OF PRESENT ILLNESS:      Chief Complaint   Patient presents with    Abdominal Pain     2 week follow up is feeling better    Results     had labs done     History was obtained from the patient. Santo De La Torre is a 59 y.o. male here today for follow up. Patient reports pain has greatly improved. No continued odynophagia. No back pain. Moving bowels normally, no blood per rectum or dark stools. Dental pain has resolved with antibiotics. No fever or chills. Labs unremarkable. Occult blood negative. MEDICATIONS:      Current Outpatient Medications   Medication Sig Dispense Refill    omeprazole (PRILOSEC) 20 MG delayed release capsule Take 1 capsule by mouth 2 times daily (before meals) 60 capsule 0    nystatin (MYCOSTATIN) 869447 UNIT/ML suspension Take 5 mLs by mouth 4 times daily 1 Bottle 0    losartan (COZAAR) 50 MG tablet Take 1 tablet by mouth daily 30 tablet 1     No current facility-administered medications for this visit. ALLERGIES:    No Known Allergies    SOCIAL HISTORY   Reviewed and no change from previous record. Kris Fritz  reports that he has been smoking cigarettes. He has a 80.00 pack-year smoking history. He has never used smokeless tobacco.    FAMILY HISTORY:    Reviewed and No change from previous visit    REVIEW OF SYSTEMS:    Review of Systems   Constitutional: Negative for appetite change, chills, diaphoresis, fatigue and fever.    HENT: Negative for congestion, dental problem, ear discharge, ear pain, hearing loss, postnasal drip, rhinorrhea, sinus pain, sore throat and exam necessary. Discussed use, benefit, and side effects of prescribed medications. All patient questions answered. Patient voiced understanding. Patient given educational materials - see patient instructions    [place] [place]  4/10/2019, 9:27 AM    Please note that this chart wasgenerated using voice recognition Dragon dictation software. Although every effort was made to ensure the accuracy of this automated transcription, some errors in transcription may have occurred.

## 2019-04-23 ENCOUNTER — TELEPHONE (OUTPATIENT)
Dept: GASTROENTEROLOGY | Age: 59
End: 2019-04-23

## 2019-06-05 NOTE — TELEPHONE ENCOUNTER
Last colon 2012 normal screen w/ [place]. Pt not due until 2022 unless having symptoms or fam hx of colon ca. Writer left msg on pt's vm to call back.

## 2019-07-08 RX ORDER — LOSARTAN POTASSIUM 50 MG/1
50 TABLET ORAL DAILY
Qty: 30 TABLET | Refills: 1 | Status: SHIPPED | OUTPATIENT
Start: 2019-07-08 | End: 2022-05-09 | Stop reason: SDUPTHER

## 2021-04-01 ENCOUNTER — TELEPHONE (OUTPATIENT)
Dept: INTERNAL MEDICINE CLINIC | Age: 61
End: 2021-04-01

## 2022-05-09 ENCOUNTER — OFFICE VISIT (OUTPATIENT)
Dept: INTERNAL MEDICINE CLINIC | Age: 62
End: 2022-05-09
Payer: COMMERCIAL

## 2022-05-09 VITALS
BODY MASS INDEX: 25.34 KG/M2 | HEIGHT: 70 IN | WEIGHT: 177 LBS | SYSTOLIC BLOOD PRESSURE: 146 MMHG | DIASTOLIC BLOOD PRESSURE: 98 MMHG

## 2022-05-09 DIAGNOSIS — L03.114 CELLULITIS OF LEFT UPPER EXTREMITY: Primary | ICD-10-CM

## 2022-05-09 DIAGNOSIS — Z13.220 SCREENING FOR HYPERLIPIDEMIA: ICD-10-CM

## 2022-05-09 DIAGNOSIS — I10 HYPERTENSION, ESSENTIAL: ICD-10-CM

## 2022-05-09 PROCEDURE — 99203 OFFICE O/P NEW LOW 30 MIN: CPT | Performed by: NURSE PRACTITIONER

## 2022-05-09 RX ORDER — LOSARTAN POTASSIUM 50 MG/1
50 TABLET ORAL DAILY
Qty: 30 TABLET | Refills: 1 | Status: SHIPPED | OUTPATIENT
Start: 2022-05-09 | End: 2022-07-06 | Stop reason: SDUPTHER

## 2022-05-09 RX ORDER — CLINDAMYCIN HYDROCHLORIDE 300 MG/1
300 CAPSULE ORAL 4 TIMES DAILY
Qty: 40 CAPSULE | Refills: 0 | Status: SHIPPED | OUTPATIENT
Start: 2022-05-09 | End: 2022-05-19

## 2022-05-09 RX ORDER — DOXYCYCLINE HYCLATE 100 MG
100 TABLET ORAL 2 TIMES DAILY
Qty: 14 TABLET | Refills: 0 | Status: SHIPPED | OUTPATIENT
Start: 2022-05-09 | End: 2022-05-16

## 2022-05-09 SDOH — ECONOMIC STABILITY: FOOD INSECURITY: WITHIN THE PAST 12 MONTHS, THE FOOD YOU BOUGHT JUST DIDN'T LAST AND YOU DIDN'T HAVE MONEY TO GET MORE.: NEVER TRUE

## 2022-05-09 SDOH — ECONOMIC STABILITY: FOOD INSECURITY: WITHIN THE PAST 12 MONTHS, YOU WORRIED THAT YOUR FOOD WOULD RUN OUT BEFORE YOU GOT MONEY TO BUY MORE.: NEVER TRUE

## 2022-05-09 SDOH — ECONOMIC STABILITY: TRANSPORTATION INSECURITY
IN THE PAST 12 MONTHS, HAS LACK OF TRANSPORTATION KEPT YOU FROM MEETINGS, WORK, OR FROM GETTING THINGS NEEDED FOR DAILY LIVING?: NO

## 2022-05-09 SDOH — ECONOMIC STABILITY: TRANSPORTATION INSECURITY
IN THE PAST 12 MONTHS, HAS THE LACK OF TRANSPORTATION KEPT YOU FROM MEDICAL APPOINTMENTS OR FROM GETTING MEDICATIONS?: NO

## 2022-05-09 ASSESSMENT — PATIENT HEALTH QUESTIONNAIRE - PHQ9
SUM OF ALL RESPONSES TO PHQ QUESTIONS 1-9: 1
SUM OF ALL RESPONSES TO PHQ9 QUESTIONS 1 & 2: 1
2. FEELING DOWN, DEPRESSED OR HOPELESS: 1
SUM OF ALL RESPONSES TO PHQ QUESTIONS 1-9: 1
SUM OF ALL RESPONSES TO PHQ QUESTIONS 1-9: 1
1. LITTLE INTEREST OR PLEASURE IN DOING THINGS: 0
SUM OF ALL RESPONSES TO PHQ QUESTIONS 1-9: 1

## 2022-05-09 ASSESSMENT — LIFESTYLE VARIABLES
HOW MANY STANDARD DRINKS CONTAINING ALCOHOL DO YOU HAVE ON A TYPICAL DAY: 5 OR 6
HOW OFTEN DO YOU HAVE A DRINK CONTAINING ALCOHOL: 4 OR MORE TIMES A WEEK

## 2022-05-09 ASSESSMENT — SOCIAL DETERMINANTS OF HEALTH (SDOH): HOW HARD IS IT FOR YOU TO PAY FOR THE VERY BASICS LIKE FOOD, HOUSING, MEDICAL CARE, AND HEATING?: NOT HARD AT ALL

## 2022-05-09 NOTE — PROGRESS NOTES
Visit Information    Have you changed or started any medications since your last visit including any over-the-counter medicines, vitamins, or herbal medicines? no   Are you having any side effects from any of your medications? -  no  Have you stopped taking any of your medications? Is so, why? -  no    Have you seen any other physician or provider since your last visit? No  Have you had any other diagnostic tests since your last visit? No  Have you been seen in the emergency room and/or had an admission to a hospital since we last saw you? No  Have you had your routine dental cleaning in the past 6 months? no    Have you activated your S[employer] account? If not, what are your barriers?  Yes     Patient Care Team:  Chase Ratliff MD as PCP - General (Internal Medicine)    Medical History Review  Past Medical, Family, and Social History reviewed and does not contribute to the patient presenting condition    Health Maintenance   Topic Date Due    COVID-19 Vaccine (1) Never done    Pneumococcal 0-64 years Vaccine (1 - PCV) Never done    HIV screen  Never done    Hepatitis C screen  Never done    DTaP/Tdap/Td vaccine (1 - Tdap) Never done    Diabetes screen  Never done    Shingles vaccine (1 of 2) Never done    Low dose CT lung screening  Never done    Potassium  02/04/2020    Creatinine  02/04/2020    Colorectal Cancer Screen  04/04/2020    Flu vaccine (Season Ended) 09/01/2022    Depression Screen  05/09/2023    Lipids  02/04/2024    Hepatitis A vaccine  Aged Out    Hepatitis B vaccine  Aged Out    Hib vaccine  Aged Out    Meningococcal (ACWY) vaccine  Aged Out         [address]  [place] [address]  Dept: [phone]  Dept Fax: [phone]    OfficeProgress/Follow Up Note  Date of patient's visit: 5/10/2022  Patient's Name:  Raegan Holly YOB: 1960            Patient Care Team:  Chase Ratliff MD as PCP - General (Internal Medicine)    REASON FOR VISIT:Same day visit    HISTORY OF PRESENT ILLNESS:      History was obtained from the patient. Kimberlyn Kruger is a 59 y.o. male here today for Para Slick is a 59 y.o. male here today for Wrist Pain (stung by a fish Thursday morning, wrost is red, swollen and painful. Patient took 3 pills of doxycycline he had at home.) and Hypertension (occasional blurred vision, New Patient )    Fish fin cut skin on Thursday  Swelling and pain noted on Friday  Patient's sister is CNP and instructed patient to go to ED for antibiotic, patient declined  Patient started taking mother's old doxycycline yesterday, reports taking 3 doses  Redness and swelling noted on L hand and forearm. Patient Active Problem List   Diagnosis    Eczema of both hands    Impacted cerumen of both ears    Hypertension, essential       MEDICATIONS:      Current Outpatient Medications   Medication Sig Dispense Refill    doxycycline hyclate (VIBRA-TABS) 100 MG tablet Take 1 tablet by mouth 2 times daily for 7 days 14 tablet 0    clindamycin (CLEOCIN) 300 MG capsule Take 1 capsule by mouth 4 times daily for 10 days 40 capsule 0    losartan (COZAAR) 50 MG tablet Take 1 tablet by mouth daily 30 tablet 1    omeprazole (PRILOSEC) 20 MG delayed release capsule Take 1 capsule by mouth 2 times daily (before meals) (Patient not taking: Reported on 5/9/2022) 60 capsule 0    nystatin (MYCOSTATIN) 719476 UNIT/ML suspension Take 5 mLs by mouth 4 times daily (Patient not taking: Reported on 5/9/2022) 1 Bottle 0     No current facility-administered medications for this visit. ALLERGIES:    No Known Allergies    SOCIAL HISTORY   Reviewed and no change from previous record. Cher Bennett  reports that he has been smoking cigarettes. He has a 80.00 pack-year smoking history.  He has never used smokeless tobacco.    FAMILY HISTORY:    Reviewed and No change from previous visit    REVIEW OF SYSTEMS:    Review of Systems Constitutional: Negative for appetite change, chills, diaphoresis, fatigue, fever and unexpected weight change. HENT: Negative for congestion, ear pain, postnasal drip, rhinorrhea, sinus pain, sore throat and trouble swallowing. Eyes: Negative for visual disturbance. Respiratory: Negative for cough, chest tightness, shortness of breath and wheezing. Cardiovascular: Negative for chest pain, palpitations and leg swelling. Gastrointestinal: Negative for abdominal pain, blood in stool, constipation, diarrhea, nausea and vomiting. Endocrine: Negative for polydipsia, polyphagia and polyuria. Genitourinary: Negative for difficulty urinating, dysuria, flank pain and urgency. Musculoskeletal: Negative for arthralgias and myalgias. Skin: Positive for color change and wound. Neurological: Negative for dizziness, syncope, weakness, light-headedness and headaches. All other systems reviewed and are negative. PHYSICAL EXAM:      Vitals:    05/09/22 1450 05/09/22 1454   BP: (!) 146/98 (!) 146/98   Site: Left Upper Arm Right Upper Arm   Weight: 177 lb (80.3 kg)    Height: 5' 10\" (1.778 m)        Physical Exam  Vitals reviewed. Constitutional:       Appearance: Normal appearance. HENT:      Head: Normocephalic. Cardiovascular:      Rate and Rhythm: Normal rate and regular rhythm. Pulses: Normal pulses. Heart sounds: Normal heart sounds. Pulmonary:      Effort: Pulmonary effort is normal.      Breath sounds: Normal breath sounds. Abdominal:      General: Bowel sounds are normal.      Palpations: Abdomen is soft. Musculoskeletal:         General: Swelling (L hand and L forearm) present. No tenderness or deformity. Normal range of motion. Skin:     General: Skin is warm and dry. Findings: Erythema (and swelling noted L hand and forearm) present. Neurological:      General: No focal deficit present. Mental Status: He is alert and oriented to person, place, and time. Psychiatric:         Mood and Affect: Mood normal.         Behavior: Behavior normal.         Thought Content: Thought content normal.         Judgment: Judgment normal.          ASSESSMENT AND PLAN:      1. Cellulitis of left upper extremity  - doxycycline hyclate (VIBRA-TABS) 100 MG tablet; Take 1 tablet by mouth 2 times daily for 7 days  Dispense: 14 tablet; Refill: 0  - clindamycin (CLEOCIN) 300 MG capsule; Take 1 capsule by mouth 4 times daily for 10 days  Dispense: 40 capsule; Refill: 0  - instructed to go to ED immediately if erythema starts traveling up arm    2. Hypertension, essential  - losartan (COZAAR) 50 MG tablet; Take 1 tablet by mouth daily  Dispense: 30 tablet; Refill: 1  - CBC with Auto Differential; Future  - Comprehensive Metabolic Panel; Future  - Lipid Panel; Future    3. Screening for hyperlipidemia  - Lipid Panel; Future      FOLLOW UP AND INSTRUCTIONS:   Return in about 1 week (around 5/16/2022) for skin check. Discussed use, benefit, and side effects of prescribed medications. All patient questions answered. Patient voiced understanding. Patient given educational materials - see patient instructions    ELVIRA Mock - CNP   [facility]  5/10/2022, 8:28 AM    Please note that this chart wasgenerated using voice recognition Dragon dictation software. Although every effort was made to ensure the accuracy of this automated transcription, some errors in transcription may have occurred.

## 2022-05-10 ENCOUNTER — HOSPITAL ENCOUNTER (OUTPATIENT)
Age: 62
Setting detail: SPECIMEN
Discharge: HOME | End: 2022-05-10

## 2022-05-10 DIAGNOSIS — I10 HYPERTENSION, ESSENTIAL: ICD-10-CM

## 2022-05-10 DIAGNOSIS — Z13.220 SCREENING FOR HYPERLIPIDEMIA: ICD-10-CM

## 2022-05-10 LAB
ABSOLUTE EOS #: 0.32 K/UL (ref 0–0.44)
ABSOLUTE IMMATURE GRANULOCYTE: 0.05 K/UL (ref 0–0.3)
ABSOLUTE LYMPH #: 1.16 K/UL (ref 1.1–3.7)
ABSOLUTE MONO #: 0.94 K/UL (ref 0.1–1.2)
ALBUMIN SERPL-MCNC: 3.8 G/DL (ref 3.5–5.2)
ALBUMIN/GLOBULIN RATIO: 1.2 (ref 1–2.5)
ALP BLD-CCNC: 51 U/L (ref 40–129)
ALT SERPL-CCNC: 19 U/L (ref 5–41)
ANION GAP SERPL CALCULATED.3IONS-SCNC: 11 MMOL/L (ref 9–17)
AST SERPL-CCNC: 33 U/L
BASOPHILS # BLD: 1 % (ref 0–2)
BASOPHILS ABSOLUTE: 0.1 K/UL (ref 0–0.2)
BILIRUB SERPL-MCNC: 0.34 MG/DL (ref 0.3–1.2)
BUN BLDV-MCNC: 14 MG/DL (ref 8–23)
CALCIUM SERPL-MCNC: 9.5 MG/DL (ref 8.6–10.4)
CHLORIDE BLD-SCNC: 100 MMOL/L (ref 98–107)
CHOLESTEROL/HDL RATIO: 2.4
CHOLESTEROL: 188 MG/DL
CO2: 23 MMOL/L (ref 20–31)
CREAT SERPL-MCNC: 0.7 MG/DL (ref 0.7–1.2)
EOSINOPHILS RELATIVE PERCENT: 4 % (ref 1–4)
GFR AFRICAN AMERICAN: >60 ML/MIN
GFR NON-AFRICAN AMERICAN: >60 ML/MIN
GFR SERPL CREATININE-BSD FRML MDRD: ABNORMAL ML/MIN/{1.73_M2}
GLUCOSE BLD-MCNC: 88 MG/DL (ref 70–99)
HCT VFR BLD CALC: 47 % (ref 40.7–50.3)
HDLC SERPL-MCNC: 77 MG/DL
HEMOGLOBIN: 15.6 G/DL (ref 13–17)
IMMATURE GRANULOCYTES: 1 %
LDL CHOLESTEROL: 101 MG/DL (ref 0–130)
LYMPHOCYTES # BLD: 15 % (ref 24–43)
MCH RBC QN AUTO: 33.5 PG (ref 25.2–33.5)
MCHC RBC AUTO-ENTMCNC: 33.2 G/DL (ref 28.4–34.8)
MCV RBC AUTO: 100.9 FL (ref 82.6–102.9)
MONOCYTES # BLD: 12 % (ref 3–12)
NRBC AUTOMATED: 0 PER 100 WBC
PDW BLD-RTO: 15.1 % (ref 11.8–14.4)
PLATELET # BLD: 297 K/UL (ref 138–453)
PMV BLD AUTO: 10.3 FL (ref 8.1–13.5)
POTASSIUM SERPL-SCNC: 5.4 MMOL/L (ref 3.7–5.3)
RBC # BLD: 4.66 M/UL (ref 4.21–5.77)
RBC # BLD: ABNORMAL 10*6/UL
SEG NEUTROPHILS: 67 % (ref 36–65)
SEGMENTED NEUTROPHILS ABSOLUTE COUNT: 5.26 K/UL (ref 1.5–8.1)
SODIUM BLD-SCNC: 134 MMOL/L (ref 135–144)
TOTAL PROTEIN: 7.1 G/DL (ref 6.4–8.3)
TRIGL SERPL-MCNC: 48 MG/DL
WBC # BLD: 7.8 K/UL (ref 3.5–11.3)

## 2022-05-10 ASSESSMENT — ENCOUNTER SYMPTOMS
COLOR CHANGE: 1
TROUBLE SWALLOWING: 0
ABDOMINAL PAIN: 0
SINUS PAIN: 0
VOMITING: 0
CHEST TIGHTNESS: 0
DIARRHEA: 0
SORE THROAT: 0
NAUSEA: 0
BLOOD IN STOOL: 0
SHORTNESS OF BREATH: 0
WHEEZING: 0
CONSTIPATION: 0
RHINORRHEA: 0
COUGH: 0

## 2022-06-02 ENCOUNTER — OFFICE VISIT (OUTPATIENT)
Dept: INTERNAL MEDICINE CLINIC | Age: 62
End: 2022-06-02
Payer: COMMERCIAL

## 2022-06-02 VITALS
BODY MASS INDEX: 28.26 KG/M2 | HEIGHT: 70 IN | SYSTOLIC BLOOD PRESSURE: 138 MMHG | DIASTOLIC BLOOD PRESSURE: 72 MMHG | OXYGEN SATURATION: 98 % | WEIGHT: 197.4 LBS | HEART RATE: 75 BPM

## 2022-06-02 DIAGNOSIS — Z12.5 PROSTATE CANCER SCREENING: ICD-10-CM

## 2022-06-02 DIAGNOSIS — Z12.11 COLON CANCER SCREENING: ICD-10-CM

## 2022-06-02 DIAGNOSIS — M25.551 RIGHT HIP PAIN: ICD-10-CM

## 2022-06-02 DIAGNOSIS — Z87.891 PERSONAL HISTORY OF TOBACCO USE: ICD-10-CM

## 2022-06-02 DIAGNOSIS — L03.114 CELLULITIS OF LEFT ARM: ICD-10-CM

## 2022-06-02 DIAGNOSIS — I10 HYPERTENSION, ESSENTIAL: Primary | ICD-10-CM

## 2022-06-02 DIAGNOSIS — Z11.59 NEED FOR HEPATITIS C SCREENING TEST: ICD-10-CM

## 2022-06-02 DIAGNOSIS — Z11.4 ENCOUNTER FOR SCREENING FOR HIV: ICD-10-CM

## 2022-06-02 DIAGNOSIS — E87.5 HYPERKALEMIA: ICD-10-CM

## 2022-06-02 PROCEDURE — 99214 OFFICE O/P EST MOD 30 MIN: CPT | Performed by: NURSE PRACTITIONER

## 2022-06-02 PROCEDURE — G0296 VISIT TO DETERM LDCT ELIG: HCPCS | Performed by: NURSE PRACTITIONER

## 2022-06-02 RX ORDER — NAPROXEN SODIUM 220 MG
220 TABLET ORAL 2 TIMES DAILY WITH MEALS
COMMUNITY
End: 2022-09-14

## 2022-06-02 NOTE — PATIENT INSTRUCTIONS
What is lung cancer screening? Lung cancer screening is a way in which doctors check the lungs for early signs of cancer in people who have no symptoms of lung cancer. A low-dose CT scan uses much less radiation than a normal CT scan and shows a more detailed image of the lungs than a standard X-ray. The goal of lung cancer screening is to find cancer early, before it has a chance to grow, spread, or cause problems. One large study found that smokers who were screened with low-dose CT scans were less likely to die of lung cancer than those who were screened with standard X-ray. Below is a summary of the things you need to know regarding screening for lung cancer with low-dose computed tomography (LDCT). This is a screening program that involves routine annual screening with LDCT studies of the lung. The LDCTs are done using low-dose radiation that is not thought to increase your cancer risk. If you have other serious medical conditions (other cancers, congestive heart failure) that limit your life expectancy to less than 10 years, you should not undergo lung cancer screening with LDCT. The chance is 20%-60% that the LDCT result will show abnormalities. This would require additional testing which could include repeat imaging or even invasive procedures. Most (about 95%) of \"abnormal\" LDCT results are false in the sense that no lung cancer is ultimately found. Additionally, some (about 10%) of the cancers found would not affect your life expectancy, even if undetected and untreated. If you are still smoking, the single most important thing that you can do to reduce your risk of dying of lung cancer is to quit. For this screening to be covered by Medicare and most other insurers, strict criteria must be met. If you do not meet these criteria, but still wish to undergo LDCT testing, you will be required to sign a waiver indicating your willingness to pay for the scan.   Patient Education        Hip Arthritis: Exercises  Introduction  Here are some examples of exercises for you to try. The exercises may be suggested for a condition or for rehabilitation. Start each exercise slowly. Ease off the exercises if you start to have pain. You will be told when to start these exercises and which ones will work bestfor you. How to do the exercises  Straight-leg raises to the outside    1. Lie on your side, with your affected hip on top. 2. Tighten the front thigh muscles of your top leg to keep your knee straight. 3. Keep your hip and your leg straight in line with the rest of your body, and keep your knee pointing forward. Do not drop your hip back. 4. Lift your top leg straight up toward the ceiling, about 12 inches off the floor. Hold for about 6 seconds, then slowly lower your leg. 5. Repeat 8 to 12 times. 6. Switch legs and repeat steps 1 through 5, even if only one hip is sore. Straight-leg raises to the inside    1. Lie on your side with your affected hip on the floor. 2. You can either prop up your other leg on a chair, or you can bend that knee and put that foot in front of your other knee. Do not drop your hip back. 3. Tighten the muscles on the front thigh of your bottom leg to straighten that knee. 4. Keep your kneecap pointing forward and your leg straight, and lift your bottom leg up toward the ceiling about 6 inches. Hold for about 6 seconds, then lower slowly. 5. Repeat 8 to 12 times. 6. Switch legs and repeat steps 1 through 5, even if only one hip is sore. Hip hike    1. Stand sideways on the bottom step of a staircase, and hold on to the banister or wall. 2. Keeping both knees straight, lift your good leg off the step and let it hang down. Then hike your good hip up to the same level as your affected hip or a little higher. 3. Repeat 8 to 12 times. 4. Switch legs and repeat steps 1 through 3, even if only one hip is sore. Bridging    1. Lie on your back with both knees bent.  Your knees should be bent about 90 degrees. 2. Then push your feet into the floor, squeeze your buttocks, and lift your hips off the floor until your shoulders, hips, and knees are all in a straight line. 3. Hold for about 6 seconds as you continue to breathe normally, and then slowly lower your hips back down to the floor and rest for up to 10 seconds. 4. Repeat 8 to 12 times. Hamstring stretch (lying down)    1. Lie flat on your back with your legs straight. If you feel discomfort in your back, place a small towel roll under your lower back. 2. Holding the back of your affected leg, lift your leg straight up and toward your body until you feel a stretch at the back of your thigh. 3. Hold the stretch for at least 30 seconds. 4. Repeat 2 to 4 times. 5. Switch legs and repeat steps 1 through 4, even if only one hip is sore. Standing quadriceps stretch    1. If you are not steady on your feet, hold on to a chair, counter, or wall. You can also lie on your stomach or your side to do this exercise. 2. Bend the knee of the leg you want to stretch, and reach behind you to grab the front of your foot or ankle with the hand on the same side. For example, if you are stretching your right leg, use your right hand. 3. Keeping your knees next to each other, pull your foot toward your buttock until you feel a gentle stretch across the front of your hip and down the front of your thigh. Your knee should be pointed directly to the ground, and not out to the side. 4. Hold the stretch for at least 15 to 30 seconds. 5. Repeat 2 to 4 times. 6. Switch legs and repeat steps 1 through 5, even if only one hip is sore. Hip rotator stretch    1. Lie on your back with both knees bent and your feet flat on the floor. 2. Put the ankle of your affected leg on your opposite thigh near your knee. 3. Use your hand to gently push your knee away from your body until you feel a gentle stretch around your hip.   4. Hold the stretch for 15 to 30 seconds. 5. Repeat 2 to 4 times. 6. Repeat steps 1 through 5, but this time use your hand to gently pull your knee toward your opposite shoulder. 7. Switch legs and repeat steps 1 through 6, even if only one hip is sore. Knee-to-chest    1. Lie on your back with your knees bent and your feet flat on the floor. 2. Bring your affected leg to your chest, keeping the other foot flat on the floor (or keeping the other leg straight, whichever feels better on your lower back). 3. Keep your lower back pressed to the floor. Hold for at least 15 to 30 seconds. 4. Relax, and lower the knee to the starting position. 5. Repeat 2 to 4 times. 6. Switch legs and repeat steps 1 through 5, even if only one hip is sore. 7. To get more stretch, put your other leg flat on the floor while pulling your knee to your chest.  Clamshell    1. Lie on your side, with your affected hip on top. Keep your feet and knees together and your knees bent. 2. Raise your top knee, but keep your feet together. Do not let your hips roll back. Your legs should open up like a clamshell. 3. Hold for 6 seconds. 4. Slowly lower your knee back down. Rest for 10 seconds. 5. Repeat 8 to 12 times. 6. Switch legs and repeat steps 1 through 5, even if only one hip is sore. Follow-up care is a key part of your treatment and safety. Be sure to make and go to all appointments, and call your doctor if you are having problems. It's also a good idea to know your test results and keep alist of the medicines you take. Where can you learn more? Go to [email or web address].[email or web address] org and sign in to your [employer] account. Enter N633 in the itBit box to learn more about \"Hip Arthritis: Exercises. \"     If you do not have an account, please click on the \"Sign Up Now\" link. Current as of: July 1, 2021               Content Version: 13.2  © 7039-2586 Healthwise, Incorporated. Care instructions adapted under license by [facility] ([facility]).  If you have questions about a medical condition or this instruction, always ask your healthcare professional. [place] [address] any warranty or liability for your use of this information.

## 2022-06-02 NOTE — PROGRESS NOTES
Visit Information    Have you changed or started any medications since your last visit including any over-the-counter medicines, vitamins, or herbal medicines? no   Are you having any side effects from any of your medications? -  no  Have you stopped taking any of your medications? Is so, why? -  no    Have you seen any other physician or provider since your last visit? No  Have you had any other diagnostic tests since your last visit? Yes - Records Obtained  Have you been seen in the emergency room and/or had an admission to a hospital since we last saw you? No  Have you had your routine dental cleaning in the past 6 months? no    Have you activated your [employer] account? If not, what are your barriers?  No:      Patient Care Team:  Ananda Burks MD as PCP - General (Internal Medicine)  Ananda Burks MD as PCP - [facility]    Medical History Review  Past Medical, Family, and Social History reviewed and does contribute to the patient presenting condition    Health Maintenance   Topic Date Due    Pneumococcal 0-64 years Vaccine (1 - PCV) Never done    DTaP/Tdap/Td vaccine (1 - Tdap) Never done    Shingles vaccine (1 of 2) Never done    Low dose CT lung screening  Never done    Colorectal Cancer Screen  04/04/2020    Flu vaccine (Season Ended) 09/01/2022    Depression Screen  05/09/2023    Prostate Specific Antigen (PSA) Screening or Monitoring  06/07/2023    Lipids  05/10/2027    COVID-19 Vaccine  Completed    Hepatitis C screen  Completed    HIV screen  Completed    Hepatitis A vaccine  Aged Out    Hepatitis B vaccine  Aged Out    Hib vaccine  Aged Out    Meningococcal (ACWY) vaccine  Aged Out         [address]  [place] [address]  Dept: [phone]  Dept Fax: [phone]    Office Progress/Follow Up Note  Date of patient's visit: 6/2/2022   Patient's Name:  Win Boudreaux  YOB: 1960            Patient Care Team:  Chase Ratliff MD as PCP - General (Internal Medicine)  Chase Ratliff MD as PCP - [facility]    REASON FOR VISIT: Cellulitis (recheck- has cleared up), Results (labs), and Hip Pain (radiates into groin and down leg)        HISTORY OF PRESENT ILLNESS:      History was obtained from the patient. Raegan Holly is a 59 y.o. is here for multiple medical problems including follow up of Cellulitis (recheck- has cleared up), Results (labs), and Hip Pain (radiates into groin and down leg)    HPI  Cellulitis of left arm - symptoms basically resolved after completing antibiotic. HTN- was started on losartan, had labs done a day or two after starting and K was slightly elevated at 5.4  Current smoker 2 packs a day. Labs from 5/10 reviewed with him, . States he is very physically active, tries to eat healthy. Due for colon cancer screen  Advised on lung cancer screen  Hip pain, right, started noticing over the last year or so, radiates into groin, sometimes into leg, mild, has not really tried anything for it. Patient Active Problem List   Diagnosis    Eczema of both hands    Impacted cerumen of both ears    Hypertension, essential       Allergies   Allergen Reactions    Chantix [Varenicline] Rash         MEDICATIONS:     Current Outpatient Medications   Medication Sig Dispense Refill    naproxen sodium (ALEVE) 220 MG tablet Take 220 mg by mouth 2 times daily (with meals)      losartan (COZAAR) 50 MG tablet Take 1 tablet by mouth daily 30 tablet 1     No current facility-administered medications for this visit. SOCIAL HISTORY    Reviewed and updated. Jayy Fang  reports that he has been smoking cigarettes. He started smoking about 49 years ago. He has a 80.00 pack-year smoking history. He has never used smokeless tobacco.    FAMILY HISTORY:    Reviewed and updated. family history includes Colon Cancer in his father; Other in his mother.     REVIEW OF SYSTEMS:      Review of Systems   Constitutional: Negative for chills, fatigue and fever. HENT: Negative for congestion and trouble swallowing. Eyes: Negative for discharge and visual disturbance. Respiratory: Negative for cough, shortness of breath and wheezing. Cardiovascular: Negative for chest pain, palpitations and leg swelling. Gastrointestinal: Negative for abdominal pain, constipation and diarrhea. Genitourinary: Negative for difficulty urinating and dysuria. Musculoskeletal: Positive for arthralgias. Negative for gait problem. Skin: Negative for color change. Neurological: Negative for dizziness, weakness, numbness and headaches. Psychiatric/Behavioral: Negative for sleep disturbance. The patient is not nervous/anxious. PHYSICAL EXAM:      Vitals:    06/02/22 1444   BP: 138/72   Pulse: 75   SpO2: 98%   Weight: 197 lb 6.4 oz (89.5 kg)   Height: 5' 10\" (1.778 m)     BP Readings from Last 3 Encounters:   06/02/22 138/72   05/09/22 (!) 146/98   04/10/19 138/82        Physical Exam  Constitutional:       General: He is not in acute distress. Appearance: Normal appearance. He is well-developed. HENT:      Head: Normocephalic and atraumatic. Right Ear: External ear normal.      Left Ear: External ear normal.      Nose: Nose normal.   Eyes:      General:         Right eye: No discharge. Left eye: No discharge. Conjunctiva/sclera: Conjunctivae normal.      Pupils: Pupils are equal, round, and reactive to light. Neck:      Thyroid: No thyromegaly. Vascular: No carotid bruit. Cardiovascular:      Rate and Rhythm: Normal rate and regular rhythm. Pulses: Normal pulses. Heart sounds: Normal heart sounds. No murmur heard. Pulmonary:      Effort: Pulmonary effort is normal.      Breath sounds: Normal breath sounds. No wheezing. Abdominal:      General: Bowel sounds are normal.      Palpations: Abdomen is soft. Tenderness: There is no abdominal tenderness. Musculoskeletal:      Cervical back: Normal range of motion and neck supple. No tenderness. Thoracic back: No tenderness. Normal range of motion. Lumbar back: No tenderness. Normal range of motion. Right hip: No deformity or tenderness. Decreased range of motion. Normal strength. Left hip: Normal.      Right lower leg: No edema. Left lower leg: No edema. Lymphadenopathy:      Cervical: No cervical adenopathy. Skin:     General: Skin is warm and dry. Neurological:      Mental Status: He is alert and oriented to person, place, and time. Gait: Gait normal.   Psychiatric:         Mood and Affect: Mood normal.         Behavior: Behavior normal.          LABORATORY FINDINGS:    CBC:   Lab Results   Component Value Date    WBC 7.8 05/10/2022    HGB 15.6 05/10/2022     05/10/2022     BMP:   Lab Results   Component Value Date     06/07/2022    K 4.3 06/07/2022     06/07/2022    CO2 22 06/07/2022    BUN 20 06/07/2022    CREATININE 0.90 06/07/2022    GLUCOSE 97 06/07/2022     HEMOGLOBIN A1C: No results found for: LABA1C  FASTING LIPID PANEL:   Lab Results   Component Value Date    CHOL 188 05/10/2022    HDL 77 05/10/2022    LDLCHOLESTEROL 101 05/10/2022    TRIG 48 05/10/2022       ASSESSMENT AND PLAN:      Visit Diagnoses and Associated Orders     Hypertension, essential    -  Primary    Close to goal, continue losartan but repeat K level, continue DASH diet, exercise.          Cellulitis of left arm        Symptoms improved s/p antibiotic          Colon cancer screening        Gina Glasgow MD, Gastroenterology, [place] [CDX97 Custom]           Personal history of tobacco use        AR VISIT TO DISCUSS LUNG CA SCREEN W LDCT [ [facility]]      CT Lung Screen (Annual) [76143 Custom]   - Future Order         Right hip pain        Offered XR which he declines, advised on stretches, tylenol/aleve prn    naproxen sodium (ALEVE) 220 MG tablet [90575] Hyperkalemia        Last K 5.4, was just started on ARB. Will repeat test    Basic Metabolic Panel [[id number] Custom]   - Future Order         Prostate cancer screening        PSA Screening [ Custom]   - Future Order         Need for hepatitis C screening test        Hepatitis C Antibody [[id number] Custom]   - Future Order         Encounter for screening for HIV        HIV Screen [[id number] Custom]   - Future Order                FOLLOW UP AND INSTRUCTIONS:     Return in about 2 months (around 8/2/2022) for HTN, or sooner if needed. · Carolyne Johnson received counseling on the following healthy behaviors: nutrition, exercise, medication adherence and tobacco cessation    · Discussed use, benefit, and side effects of prescribed medications. Barriers to medication compliance addressed. All patient questions answered. Pt voiced understanding. · Patient given educational materials - see patient instructions    ELVIRA Kendall CNP    6/8/2022, 12:14 PM    Please note that this chart was generated using voice recognition Dragon dictation software. Although every effort was made to ensure the accuracy of this automatedtranscription, some errors in transcription may have occurred. Low Dose CT (LDCT) Lung Screening criteria met:     Age 50-77(Medicare) or 50-80 (USPST)   Pack year smoking >20   Still smoking or less than 15 year since quit   No sign or symptoms of lung cancer   > 11 months since last LDCT     Risks and benefits of lung cancer screening with LDCT scans discussed:    Significance of positive screen - False-positive LDCT results often occur. 95% of all positive results do not lead to a diagnosis of cancer. Usually further imaging can resolve most false-positive results; however, some patients may require invasive procedures.     Over diagnosis risk - 10% to 12% of screen-detected lung cancer cases are over diagnosed--that is, the cancer would not have been detected in the patient's lifetime without the screening. Need for follow up screens annually to continue lung cancer screening effectiveness     Risks associated with radiation from annual LDCT- Radiation exposure is about the same as for a mammogram, which is about 1/3 of the annual background radiation exposure from everyday life. Starting screening at age 54 is not likely to increase cancer risk from radiation exposure. Patients with comorbidities resulting in life expectancy of < 10 years, or that would preclude treatment of an abnormality identified on CT, should not be screened due to lack of benefit.     To obtain maximal benefit from this screening, smoking cessation and long-term abstinence from smoking is critical

## 2022-06-03 ENCOUNTER — TELEPHONE (OUTPATIENT)
Dept: GASTROENTEROLOGY | Age: 62
End: 2022-06-03

## 2022-06-07 ENCOUNTER — HOSPITAL ENCOUNTER (OUTPATIENT)
Age: 62
Setting detail: SPECIMEN
Discharge: HOME | End: 2022-06-07

## 2022-06-07 DIAGNOSIS — Z11.4 ENCOUNTER FOR SCREENING FOR HIV: ICD-10-CM

## 2022-06-07 DIAGNOSIS — Z12.5 PROSTATE CANCER SCREENING: ICD-10-CM

## 2022-06-07 DIAGNOSIS — E87.5 HYPERKALEMIA: ICD-10-CM

## 2022-06-07 DIAGNOSIS — Z11.59 NEED FOR HEPATITIS C SCREENING TEST: ICD-10-CM

## 2022-06-07 LAB
ANION GAP SERPL CALCULATED.3IONS-SCNC: 12 MMOL/L (ref 9–17)
BUN BLDV-MCNC: 20 MG/DL (ref 8–23)
CALCIUM SERPL-MCNC: 9.5 MG/DL (ref 8.6–10.4)
CHLORIDE BLD-SCNC: 106 MMOL/L (ref 98–107)
CO2: 22 MMOL/L (ref 20–31)
CREAT SERPL-MCNC: 0.9 MG/DL (ref 0.7–1.2)
GFR AFRICAN AMERICAN: >60 ML/MIN
GFR NON-AFRICAN AMERICAN: >60 ML/MIN
GFR SERPL CREATININE-BSD FRML MDRD: NORMAL ML/MIN/{1.73_M2}
GLUCOSE BLD-MCNC: 97 MG/DL (ref 70–99)
HEPATITIS C ANTIBODY: NONREACTIVE
HIV AG/AB: NONREACTIVE
POTASSIUM SERPL-SCNC: 4.3 MMOL/L (ref 3.7–5.3)
PROSTATE SPECIFIC ANTIGEN: 2.89 NG/ML
SODIUM BLD-SCNC: 140 MMOL/L (ref 135–144)

## 2022-06-08 ASSESSMENT — ENCOUNTER SYMPTOMS
CONSTIPATION: 0
DIARRHEA: 0
ABDOMINAL PAIN: 0
COUGH: 0
COLOR CHANGE: 0
TROUBLE SWALLOWING: 0
SHORTNESS OF BREATH: 0
WHEEZING: 0
EYE DISCHARGE: 0

## 2022-07-06 DIAGNOSIS — I10 HYPERTENSION, ESSENTIAL: ICD-10-CM

## 2022-07-06 RX ORDER — LOSARTAN POTASSIUM 50 MG/1
50 TABLET ORAL DAILY
Qty: 30 TABLET | Refills: 1 | Status: SHIPPED | OUTPATIENT
Start: 2022-07-06 | End: 2022-09-06 | Stop reason: SDUPTHER

## 2022-07-06 NOTE — TELEPHONE ENCOUNTER
----- Message from Anna Sanchez sent at 7/6/2022  1:26 PM EDT -----  Subject: Refill Request    QUESTIONS  Name of Medication? losartan (COZAAR) 50 MG tablet  Patient-reported dosage and instructions? once daily. How many days do you have left? 2  Preferred Pharmacy? [facility] [id number]  Pharmacy phone number (if available)? [phone]  ---------------------------------------------------------------------------  --------------  CALL BACK INFO  What is the best way for the office to contact you? OK to leave message on   voicemail  Preferred Call Back Phone Number? [phone]  ---------------------------------------------------------------------------  --------------  SCRIPT ANSWERS  Relationship to Patient?  Self

## 2022-09-06 DIAGNOSIS — I10 HYPERTENSION, ESSENTIAL: ICD-10-CM

## 2022-09-06 RX ORDER — LOSARTAN POTASSIUM 50 MG/1
50 TABLET ORAL DAILY
Qty: 30 TABLET | Refills: 1 | Status: SHIPPED | OUTPATIENT
Start: 2022-09-06

## 2022-09-06 NOTE — TELEPHONE ENCOUNTER
----- Message from Rafael Hagen sent at 9/6/2022  8:41 AM EDT -----  Subject: Refill Request    QUESTIONS  Name of Medication? losartan (COZAAR) 50 MG tablet  Patient-reported dosage and instructions? 50mg pd  How many days do you have left? 7  Preferred Pharmacy? [facility] [id number]  Pharmacy phone number (if available)? [phone]  Additional Information for Provider? pt contact the pt when his script is   called in.   ---------------------------------------------------------------------------  --------------  CALL BACK INFO  What is the best way for the office to contact you? OK to leave message on   voicemail  Preferred Call Back Phone Number? [phone]  ---------------------------------------------------------------------------  --------------  SCRIPT ANSWERS  Relationship to Patient?  Self

## 2022-09-14 ENCOUNTER — APPOINTMENT (OUTPATIENT)
Dept: CT IMAGING | Age: 62
DRG: 897 | End: 2022-09-14
Payer: COMMERCIAL

## 2022-09-14 ENCOUNTER — HOSPITAL ENCOUNTER (INPATIENT)
Age: 62
LOS: 2 days | Discharge: HOME | DRG: 897 | End: 2022-09-16
Attending: EMERGENCY MEDICINE | Admitting: INTERNAL MEDICINE
Payer: COMMERCIAL

## 2022-09-14 ENCOUNTER — APPOINTMENT (OUTPATIENT)
Dept: GENERAL RADIOLOGY | Age: 62
DRG: 897 | End: 2022-09-14
Payer: COMMERCIAL

## 2022-09-14 DIAGNOSIS — R56.9 SEIZURE-LIKE ACTIVITY (HCC): Primary | ICD-10-CM

## 2022-09-14 LAB
ABSOLUTE EOS #: 0.7 K/UL (ref 0–0.4)
ABSOLUTE LYMPH #: 1.6 K/UL (ref 1–4.8)
ABSOLUTE MONO #: 0.9 K/UL (ref 0.1–1.3)
ANION GAP SERPL CALCULATED.3IONS-SCNC: 13 MMOL/L (ref 9–17)
BASOPHILS # BLD: 1 % (ref 0–2)
BASOPHILS ABSOLUTE: 0.1 K/UL (ref 0–0.2)
BUN BLDV-MCNC: 16 MG/DL (ref 8–23)
CALCIUM SERPL-MCNC: 9.3 MG/DL (ref 8.6–10.4)
CHLORIDE BLD-SCNC: 93 MMOL/L (ref 98–107)
CO2: 21 MMOL/L (ref 20–31)
CREAT SERPL-MCNC: 0.77 MG/DL (ref 0.7–1.2)
EOSINOPHILS RELATIVE PERCENT: 7 % (ref 0–4)
GFR AFRICAN AMERICAN: >60 ML/MIN
GFR NON-AFRICAN AMERICAN: >60 ML/MIN
GFR SERPL CREATININE-BSD FRML MDRD: ABNORMAL ML/MIN/{1.73_M2}
GLUCOSE BLD-MCNC: 93 MG/DL (ref 70–99)
HCT VFR BLD CALC: 43.3 % (ref 41–53)
HEMOGLOBIN: 15.1 G/DL (ref 13.5–17.5)
LYMPHOCYTES # BLD: 15 % (ref 24–44)
MCH RBC QN AUTO: 33.3 PG (ref 26–34)
MCHC RBC AUTO-ENTMCNC: 35 G/DL (ref 31–37)
MCV RBC AUTO: 95.2 FL (ref 80–100)
MONOCYTES # BLD: 8 % (ref 1–7)
PDW BLD-RTO: 13.5 % (ref 11.5–14.9)
PLATELET # BLD: 297 K/UL (ref 150–450)
PMV BLD AUTO: 7.3 FL (ref 6–12)
POTASSIUM SERPL-SCNC: 3.8 MMOL/L (ref 3.7–5.3)
RBC # BLD: 4.55 M/UL (ref 4.5–5.9)
SEG NEUTROPHILS: 69 % (ref 36–66)
SEGMENTED NEUTROPHILS ABSOLUTE COUNT: 7.3 K/UL (ref 1.3–9.1)
SODIUM BLD-SCNC: 127 MMOL/L (ref 135–144)
TROPONIN, HIGH SENSITIVITY: 7 NG/L (ref 0–22)
TROPONIN, HIGH SENSITIVITY: 7 NG/L (ref 0–22)
WBC # BLD: 10.5 K/UL (ref 3.5–11)

## 2022-09-14 PROCEDURE — 93005 ELECTROCARDIOGRAM TRACING: CPT | Performed by: STUDENT IN AN ORGANIZED HEALTH CARE EDUCATION/TRAINING PROGRAM

## 2022-09-14 PROCEDURE — 85025 COMPLETE CBC W/AUTO DIFF WBC: CPT

## 2022-09-14 PROCEDURE — 36415 COLL VENOUS BLD VENIPUNCTURE: CPT

## 2022-09-14 PROCEDURE — 84484 ASSAY OF TROPONIN QUANT: CPT

## 2022-09-14 PROCEDURE — 80048 BASIC METABOLIC PNL TOTAL CA: CPT

## 2022-09-14 PROCEDURE — 99285 EMERGENCY DEPT VISIT HI MDM: CPT

## 2022-09-14 PROCEDURE — 72125 CT NECK SPINE W/O DYE: CPT

## 2022-09-14 PROCEDURE — 73030 X-RAY EXAM OF SHOULDER: CPT

## 2022-09-14 PROCEDURE — 4A10X4Z MONITORING OF CENTRAL NERVOUS ELECTRICAL ACTIVITY, EXTERNAL APPROACH: ICD-10-PCS | Performed by: STUDENT IN AN ORGANIZED HEALTH CARE EDUCATION/TRAINING PROGRAM

## 2022-09-14 PROCEDURE — 2580000003 HC RX 258: Performed by: STUDENT IN AN ORGANIZED HEALTH CARE EDUCATION/TRAINING PROGRAM

## 2022-09-14 PROCEDURE — 71045 X-RAY EXAM CHEST 1 VIEW: CPT

## 2022-09-14 PROCEDURE — 2060000000 HC ICU INTERMEDIATE R&B

## 2022-09-14 PROCEDURE — 70450 CT HEAD/BRAIN W/O DYE: CPT

## 2022-09-14 RX ORDER — 0.9 % SODIUM CHLORIDE 0.9 %
1000 INTRAVENOUS SOLUTION INTRAVENOUS ONCE
Status: COMPLETED | OUTPATIENT
Start: 2022-09-14 | End: 2022-09-15

## 2022-09-14 RX ADMIN — SODIUM CHLORIDE 1000 ML: 9 INJECTION, SOLUTION INTRAVENOUS at 23:30

## 2022-09-14 ASSESSMENT — PAIN - FUNCTIONAL ASSESSMENT: PAIN_FUNCTIONAL_ASSESSMENT: 0-10

## 2022-09-14 ASSESSMENT — PAIN SCALES - GENERAL
PAINLEVEL_OUTOF10: 7
PAINLEVEL_OUTOF10: 2

## 2022-09-15 ENCOUNTER — APPOINTMENT (OUTPATIENT)
Dept: MRI IMAGING | Age: 62
DRG: 897 | End: 2022-09-15
Payer: COMMERCIAL

## 2022-09-15 ENCOUNTER — APPOINTMENT (OUTPATIENT)
Dept: NON INVASIVE DIAGNOSTICS | Age: 62
DRG: 897 | End: 2022-09-15
Payer: COMMERCIAL

## 2022-09-15 ENCOUNTER — APPOINTMENT (OUTPATIENT)
Dept: NEUROLOGY | Age: 62
DRG: 897 | End: 2022-09-15
Payer: COMMERCIAL

## 2022-09-15 PROBLEM — M25.511 ACUTE PAIN OF RIGHT SHOULDER: Status: ACTIVE | Noted: 2022-09-15

## 2022-09-15 PROBLEM — R20.0 NUMBNESS AND TINGLING IN BOTH HANDS: Status: ACTIVE | Noted: 2022-09-15

## 2022-09-15 PROBLEM — F10.929 ALCOHOLIC INTOXICATION WITH COMPLICATION (HCC): Status: ACTIVE | Noted: 2022-09-15

## 2022-09-15 PROBLEM — R20.2 NUMBNESS AND TINGLING IN BOTH HANDS: Status: ACTIVE | Noted: 2022-09-15

## 2022-09-15 PROBLEM — R55 SYNCOPAL EPISODES: Status: ACTIVE | Noted: 2022-09-15

## 2022-09-15 LAB
ALBUMIN SERPL-MCNC: 4.2 G/DL (ref 3.5–5.2)
ALP BLD-CCNC: 62 U/L (ref 40–129)
ALT SERPL-CCNC: 13 U/L (ref 5–41)
ANION GAP SERPL CALCULATED.3IONS-SCNC: 13 MMOL/L (ref 9–17)
AST SERPL-CCNC: 32 U/L
BILIRUB SERPL-MCNC: 0.5 MG/DL (ref 0.3–1.2)
BUN BLDV-MCNC: 11 MG/DL (ref 8–23)
CALCIUM SERPL-MCNC: 9.1 MG/DL (ref 8.6–10.4)
CHLORIDE BLD-SCNC: 104 MMOL/L (ref 98–107)
CO2: 19 MMOL/L (ref 20–31)
CREAT SERPL-MCNC: 0.61 MG/DL (ref 0.7–1.2)
EKG ATRIAL RATE: 81 BPM
EKG P AXIS: 46 DEGREES
EKG P-R INTERVAL: 208 MS
EKG Q-T INTERVAL: 388 MS
EKG QRS DURATION: 108 MS
EKG QTC CALCULATION (BAZETT): 450 MS
EKG R AXIS: 34 DEGREES
EKG T AXIS: 44 DEGREES
EKG VENTRICULAR RATE: 81 BPM
GFR AFRICAN AMERICAN: >60 ML/MIN
GFR NON-AFRICAN AMERICAN: >60 ML/MIN
GFR SERPL CREATININE-BSD FRML MDRD: ABNORMAL ML/MIN/{1.73_M2}
GLUCOSE BLD-MCNC: 88 MG/DL (ref 70–99)
LV EF: 53 %
LVEF MODALITY: NORMAL
MAGNESIUM: 2 MG/DL (ref 1.6–2.6)
PHOSPHORUS: 3.3 MG/DL (ref 2.5–4.5)
POTASSIUM SERPL-SCNC: 4.7 MMOL/L (ref 3.7–5.3)
SODIUM BLD-SCNC: 136 MMOL/L (ref 135–144)
TOTAL PROTEIN: 7.4 G/DL (ref 6.4–8.3)
TSH SERPL DL<=0.05 MIU/L-ACNC: 0.56 UIU/ML (ref 0.3–5)

## 2022-09-15 PROCEDURE — 99222 1ST HOSP IP/OBS MODERATE 55: CPT | Performed by: PSYCHIATRY & NEUROLOGY

## 2022-09-15 PROCEDURE — 80053 COMPREHEN METABOLIC PANEL: CPT

## 2022-09-15 PROCEDURE — 83735 ASSAY OF MAGNESIUM: CPT

## 2022-09-15 PROCEDURE — 2060000000 HC ICU INTERMEDIATE R&B

## 2022-09-15 PROCEDURE — 93010 ELECTROCARDIOGRAM REPORT: CPT | Performed by: INTERNAL MEDICINE

## 2022-09-15 PROCEDURE — 6370000000 HC RX 637 (ALT 250 FOR IP): Performed by: NURSE PRACTITIONER

## 2022-09-15 PROCEDURE — 95819 EEG AWAKE AND ASLEEP: CPT

## 2022-09-15 PROCEDURE — 95819 EEG AWAKE AND ASLEEP: CPT | Performed by: PSYCHIATRY & NEUROLOGY

## 2022-09-15 PROCEDURE — 70551 MRI BRAIN STEM W/O DYE: CPT

## 2022-09-15 PROCEDURE — 2580000003 HC RX 258: Performed by: NURSE PRACTITIONER

## 2022-09-15 PROCEDURE — 84100 ASSAY OF PHOSPHORUS: CPT

## 2022-09-15 PROCEDURE — 93306 TTE W/DOPPLER COMPLETE: CPT

## 2022-09-15 PROCEDURE — 6360000002 HC RX W HCPCS: Performed by: NURSE PRACTITIONER

## 2022-09-15 PROCEDURE — 36415 COLL VENOUS BLD VENIPUNCTURE: CPT

## 2022-09-15 PROCEDURE — 99223 1ST HOSP IP/OBS HIGH 75: CPT | Performed by: INTERNAL MEDICINE

## 2022-09-15 PROCEDURE — 84443 ASSAY THYROID STIM HORMONE: CPT

## 2022-09-15 RX ORDER — ONDANSETRON 2 MG/ML
4 INJECTION INTRAMUSCULAR; INTRAVENOUS EVERY 6 HOURS PRN
Status: DISCONTINUED | OUTPATIENT
Start: 2022-09-15 | End: 2022-09-16 | Stop reason: HOSPADM

## 2022-09-15 RX ORDER — POLYETHYLENE GLYCOL 3350 17 G/17G
17 POWDER, FOR SOLUTION ORAL DAILY PRN
Status: DISCONTINUED | OUTPATIENT
Start: 2022-09-15 | End: 2022-09-16 | Stop reason: HOSPADM

## 2022-09-15 RX ORDER — ACETAMINOPHEN 325 MG/1
650 TABLET ORAL EVERY 6 HOURS PRN
Status: DISCONTINUED | OUTPATIENT
Start: 2022-09-15 | End: 2022-09-16 | Stop reason: HOSPADM

## 2022-09-15 RX ORDER — LORAZEPAM 2 MG/ML
1 INJECTION INTRAMUSCULAR EVERY 5 MIN PRN
Status: DISCONTINUED | OUTPATIENT
Start: 2022-09-15 | End: 2022-09-16 | Stop reason: HOSPADM

## 2022-09-15 RX ORDER — ACETAMINOPHEN 650 MG/1
650 SUPPOSITORY RECTAL EVERY 6 HOURS PRN
Status: DISCONTINUED | OUTPATIENT
Start: 2022-09-15 | End: 2022-09-16 | Stop reason: HOSPADM

## 2022-09-15 RX ORDER — LOSARTAN POTASSIUM 50 MG/1
50 TABLET ORAL DAILY
Status: DISCONTINUED | OUTPATIENT
Start: 2022-09-15 | End: 2022-09-16 | Stop reason: HOSPADM

## 2022-09-15 RX ORDER — SODIUM CHLORIDE 0.9 % (FLUSH) 0.9 %
5-40 SYRINGE (ML) INJECTION PRN
Status: DISCONTINUED | OUTPATIENT
Start: 2022-09-15 | End: 2022-09-16 | Stop reason: HOSPADM

## 2022-09-15 RX ORDER — ONDANSETRON 4 MG/1
4 TABLET, ORALLY DISINTEGRATING ORAL EVERY 8 HOURS PRN
Status: DISCONTINUED | OUTPATIENT
Start: 2022-09-15 | End: 2022-09-16 | Stop reason: HOSPADM

## 2022-09-15 RX ORDER — SODIUM CHLORIDE 9 MG/ML
INJECTION, SOLUTION INTRAVENOUS PRN
Status: DISCONTINUED | OUTPATIENT
Start: 2022-09-15 | End: 2022-09-16 | Stop reason: HOSPADM

## 2022-09-15 RX ORDER — SODIUM CHLORIDE 9 MG/ML
INJECTION, SOLUTION INTRAVENOUS CONTINUOUS
Status: DISCONTINUED | OUTPATIENT
Start: 2022-09-15 | End: 2022-09-16 | Stop reason: HOSPADM

## 2022-09-15 RX ORDER — SODIUM CHLORIDE 0.9 % (FLUSH) 0.9 %
5-40 SYRINGE (ML) INJECTION EVERY 12 HOURS SCHEDULED
Status: DISCONTINUED | OUTPATIENT
Start: 2022-09-15 | End: 2022-09-16 | Stop reason: HOSPADM

## 2022-09-15 RX ORDER — ENOXAPARIN SODIUM 100 MG/ML
40 INJECTION SUBCUTANEOUS DAILY
Status: DISCONTINUED | OUTPATIENT
Start: 2022-09-15 | End: 2022-09-16 | Stop reason: HOSPADM

## 2022-09-15 RX ADMIN — ENOXAPARIN SODIUM 40 MG: 100 INJECTION SUBCUTANEOUS at 10:01

## 2022-09-15 RX ADMIN — SODIUM CHLORIDE: 9 INJECTION, SOLUTION INTRAVENOUS at 22:15

## 2022-09-15 RX ADMIN — LOSARTAN POTASSIUM 50 MG: 50 TABLET, FILM COATED ORAL at 10:01

## 2022-09-15 RX ADMIN — SODIUM CHLORIDE: 9 INJECTION, SOLUTION INTRAVENOUS at 01:43

## 2022-09-15 ASSESSMENT — ENCOUNTER SYMPTOMS
CONSTIPATION: 0
SHORTNESS OF BREATH: 0
WHEEZING: 0
BACK PAIN: 0
ABDOMINAL PAIN: 0
VOMITING: 0
TROUBLE SWALLOWING: 0
NAUSEA: 0
DIARRHEA: 0
COUGH: 0
RHINORRHEA: 0
SORE THROAT: 0

## 2022-09-15 ASSESSMENT — PAIN SCALES - GENERAL
PAINLEVEL_OUTOF10: 0
PAINLEVEL_OUTOF10: 3

## 2022-09-15 ASSESSMENT — PAIN DESCRIPTION - PAIN TYPE: TYPE: ACUTE PAIN

## 2022-09-15 ASSESSMENT — PAIN - FUNCTIONAL ASSESSMENT: PAIN_FUNCTIONAL_ASSESSMENT: ACTIVITIES ARE NOT PREVENTED

## 2022-09-15 ASSESSMENT — PAIN DESCRIPTION - LOCATION: LOCATION: SHOULDER

## 2022-09-15 ASSESSMENT — PAIN DESCRIPTION - ORIENTATION: ORIENTATION: RIGHT

## 2022-09-15 ASSESSMENT — PAIN DESCRIPTION - DESCRIPTORS: DESCRIPTORS: ACHING

## 2022-09-15 NOTE — ED PROVIDER NOTES
VITALS:   /81   Pulse 78   Temp 98.6 °F (37 °C) (Oral)   Resp 20   Wt 197 lb (89.4 kg)   SpO2 93%   BMI 28.27 kg/m²     Physical Exam  Constitutional:       General: He is not in acute distress. Appearance: Normal appearance. He is not ill-appearing, toxic-appearing or diaphoretic. HENT:      Head:      Comments: Small abrasions to the forehead, no active bleeding     Right Ear: There is no impacted cerumen. Left Ear: There is no impacted cerumen. Nose: No congestion or rhinorrhea. Eyes:      General:         Right eye: No discharge. Left eye: No discharge. Extraocular Movements: Extraocular movements intact. Pupils: Pupils are equal, round, and reactive to light. Cardiovascular:      Rate and Rhythm: Normal rate and regular rhythm. Heart sounds: No murmur heard. No friction rub. No gallop. Pulmonary:      Effort: No respiratory distress. Breath sounds: No stridor. No wheezing, rhonchi or rales. Chest:      Chest wall: No tenderness. Abdominal:      General: There is no distension. Palpations: There is no mass. Tenderness: There is no abdominal tenderness. There is no guarding or rebound. Hernia: No hernia is present. Musculoskeletal:         General: No swelling, tenderness, deformity or signs of injury. Cervical back: No rigidity or tenderness. Skin:     Capillary Refill: Capillary refill takes less than 2 seconds. Coloration: Skin is not jaundiced or pale. Findings: No bruising, erythema, lesion or rash. Neurological:      General: No focal deficit present. Mental Status: He is alert and oriented to person, place, and time. Cranial Nerves: No cranial nerve deficit. Sensory: No sensory deficit. Motor: No weakness.       Coordination: Coordination normal.   Psychiatric:         Mood and Affect: Mood normal.         Behavior: Behavior normal.       DIFFERENTIAL  DIAGNOSIS     PLAN (LABS / IMAGING / EKG):  Orders Placed This Encounter   Procedures    CT HEAD WO CONTRAST    CT CERVICAL SPINE WO CONTRAST    XR SHOULDER RIGHT (MIN 2 VIEWS)    XR CHEST PORTABLE    MRI BRAIN WO CONTRAST    CBC with Auto Differential    Basic Metabolic Panel w/ Reflex to MG    Troponin    Magnesium    Orthostatic blood pressure and pulse    Inpatient consult to Neurology    Inpatient consult to Internal Medicine    EKG 12 Lead    EEG    ADMIT TO INPATIENT       MEDICATIONS ORDERED:  Orders Placed This Encounter   Medications    0.9 % sodium chloride bolus       DDX: Seizure, arrhythmia, electrolyte abnormality, ACS, acute CVA, intracranial hemorrhage, intracranial mass    DIAGNOSTIC RESULTS / EMERGENCY DEPARTMENT COURSE / MDM   LAB RESULTS:  Results for orders placed or performed during the hospital encounter of 09/14/22   CBC with Auto Differential   Result Value Ref Range    WBC 10.5 3.5 - 11.0 k/uL    RBC 4.55 4.5 - 5.9 m/uL    Hemoglobin 15.1 13.5 - 17.5 g/dL    Hematocrit 43.3 41 - 53 %    MCV 95.2 80 - 100 fL    MCH 33.3 26 - 34 pg    MCHC 35.0 31 - 37 g/dL    RDW 13.5 11.5 - 14.9 %    Platelets 005 249 - 399 k/uL    MPV 7.3 6.0 - 12.0 fL    Seg Neutrophils 69 (H) 36 - 66 %    Lymphocytes 15 (L) 24 - 44 %    Monocytes 8 (H) 1 - 7 %    Eosinophils % 7 (H) 0 - 4 %    Basophils 1 0 - 2 %    Segs Absolute 7.30 1.3 - 9.1 k/uL    Absolute Lymph # 1.60 1.0 - 4.8 k/uL    Absolute Mono # 0.90 0.1 - 1.3 k/uL    Absolute Eos # 0.70 (H) 0.0 - 0.4 k/uL    Basophils Absolute 0.10 0.0 - 0.2 k/uL   Basic Metabolic Panel w/ Reflex to MG   Result Value Ref Range    Glucose 93 70 - 99 mg/dL    BUN 16 8 - 23 mg/dL    Creatinine 0.77 0.70 - 1.20 mg/dL    Calcium 9.3 8.6 - 10.4 mg/dL    Sodium 127 (L) 135 - 144 mmol/L    Potassium 3.8 3.7 - 5.3 mmol/L    Chloride 93 (L) 98 - 107 mmol/L    CO2 21 20 - 31 mmol/L    Anion Gap 13 9 - 17 mmol/L    GFR Non-African American >60 >60 mL/min    GFR African American >60 >60 mL/min    GFR Comment Troponin   Result Value Ref Range    Troponin, High Sensitivity 7 0 - 22 ng/L   Troponin   Result Value Ref Range    Troponin, High Sensitivity 7 0 - 22 ng/L   EKG 12 Lead   Result Value Ref Range    Ventricular Rate 81 BPM    Atrial Rate 81 BPM    P-R Interval 208 ms    QRS Duration 108 ms    Q-T Interval 388 ms    QTc Calculation (Bazett) 450 ms    P Axis 46 degrees    R Axis 34 degrees    T Axis 44 degrees       IMPRESSION: Laboratory testing unremarkable    RADIOLOGY:  XR SHOULDER RIGHT (MIN 2 VIEWS)   Final Result      Right shoulder: No fracture or dislocation      Chest radiograph:      Small right pleural effusion. Linear atelectasis/scarring of both lungs. Focal consolidative changes at the bases, right greater than left side, may   be suggestive of atelectasis. Superimposed infiltrate cannot be excluded. XR CHEST PORTABLE   Final Result      Right shoulder: No fracture or dislocation      Chest radiograph:      Small right pleural effusion. Linear atelectasis/scarring of both lungs. Focal consolidative changes at the bases, right greater than left side, may   be suggestive of atelectasis. Superimposed infiltrate cannot be excluded. CT CERVICAL SPINE WO CONTRAST   Final Result   Head CT: No acute intracranial abnormality. No evidence for acute   intracranial hemorrhage, territorial infarction or intracranial mass lesion. Cervical CT: No acute abnormality of the cervical spine. No acute fracture. At C5-C6, severe canal stenosis and severe bilateral neural foraminal   narrowing. CT HEAD WO CONTRAST   Final Result   Head CT: No acute intracranial abnormality. No evidence for acute   intracranial hemorrhage, territorial infarction or intracranial mass lesion. Cervical CT: No acute abnormality of the cervical spine. No acute fracture. At C5-C6, severe canal stenosis and severe bilateral neural foraminal   narrowing.          MRI BRAIN WO CONTRAST    (Results Pending)         EKG  EKG Interpretation    Interpreted by me    Rhythm: normal sinus   Rate: normal  Axis: normal  Ectopy: none  Conduction: normal  ST Segments: no acute change  T Waves: no acute change  Q Waves: none    Clinical Impression: no acute changes and normal EKG    All EKG's are interpreted by the Emergency Department Physician who either signs or Co-signs this chart in the absence of a cardiologist.    EMERGENCY DEPARTMENT COURSE:  25-year-old male present with syncope on examination patient no acute distress, nontoxic-appearing speaking full sentences. No focal neurologic deficits. Vitals signs within normal notes. Patient is GCS 15 on examination. Work-up grossly unremarkable. Imaging shows no probable cause besides a for slightly low sodium at 127 however this is not low enough to typically cause seizures. No history of ethanol withdrawal.  No focal neurologic deficits again admitting this is acute CVA. Discussed with family as well as patient and will admit to medicine with neurology consultation. Neurology wanting EEG and MRI in the morning. Patient is amenable to admission. ED Course as of 09/15/22 0034   Wed Sep 14, 2022   2244 Discussed with neurology they recommend admission as well as ordering a routine EEG and MRI of the brain [MS]      ED Course User Index  [MS] Jay Odom DO       No notes of [facility] Admission Criteria type on file. PROCEDURES:  N/a    CONSULTS:  IP CONSULT TO NEUROLOGY  IP CONSULT TO INTERNAL MEDICINE    CRITICAL CARE:  N/a    FINAL IMPRESSION      1. Seizure-like activity ([facility] [address]          DISPOSITION / PLAN     DISPOSITION Admitted 09/14/2022 11:16:22 PM      PATIENT REFERRED TO:  No follow-up provider specified.     DISCHARGE MEDICATIONS:  New Prescriptions    No medications on file       Elver Bronson DO  Emergency Medicine Resident    (Please note that portions of thisnote were completed with a voice recognition program.  Efforts were made to edit the dictations but occasionally words are mis-transcribed.)        Nate Parikh DO  Resident  09/15/22 4048

## 2022-09-15 NOTE — PLAN OF CARE
Please complete the MRI screening form online. MRI will be schedule once screening has been completed. Any questions, please call [phone]. Thank you!

## 2022-09-15 NOTE — PLAN OF CARE
Problem: Discharge Planning  Goal: Discharge to home or other facility with appropriate resources  Outcome: Progressing   Continuing monitoring to make adequate for discharge  Problem: Pain  Goal: Verbalizes/displays adequate comfort level or baseline comfort level  Outcome: Progressing   Pt pain controlled  Problem: Safety - Adult  Goal: Free from fall injury  Outcome: Progressing   Pt free from fall  Problem: ABCDS Injury Assessment  Goal: Absence of physical injury  Outcome: Progressing   No injury

## 2022-09-15 NOTE — H&P
[address] [facility] Internal Medicine  Laci Merchant MD; Melania Garcia MD; Samantha Britt MD; MD Nancy Johnson MD; Velta Bernheim, MD  [facility]   [address] [facility]     HISTORY AND PHYSICAL EXAMINATION            Date:   9/15/2022  Patientname:  Chavo Escobar  Date of admission:  9/14/2022  8:55 PM  MRN:   [id number]  Account:  [de-identified]  YOB: 1960  PCP:    Maddison Apple MD  Room:   02/02  Code Status:    Full Code      Chief Complaint:     Chief Complaint   Patient presents with    Loss of Consciousness       History Obtained From:     patient, family member - daughter, and ED provider    History of Present Illness:     Chavo Escobar is a 57 y.o. Non- / non  male who presents with Loss of Consciousness  and is admitted to the hospital for the management of Seizure-like activity ([facility] [address] According to patient, he drank about 15-18 beers today and smoked a little marijuana, then got up to go to the bathroom, and woke up on the floor. Daughter reports the patient stood up took 2 steps and fell forward on his face. Daughter reports that she immediately rolled him over onto his back, at which time he \"sucked in a large amount of air and his eyes rolled back in his head. \"  Daughter reports that patient was foaming at the mouth and states that Danne Hunting was gone for a few minutes. \"  Patient has no recollection of the incident. Denies any prodromal symptoms such as dizziness, lightheadedness, and chest pain prior to syncopal event. Denies previous episodes of similar episodes. Symptoms are associated with loss of bladder control; patient incontinent of urine while unconscious. Additionally, patient reports pain in right shoulder and numbness in both hands; right greater than left. He is noted to have a large abrasion on the left side of his forehead and an abrasion on the top of his head.   Denies fever, chills, chest pain, cough, abdominal pain, nausea, vomiting, diarrhea, and urinary symptoms. There are no aggravating or alleviating factors. Symptoms are reported as constant and moderate. Patient reports that he woke this morning and drink several cups of coffee, then later started drinking beer. States that he ate a sandwich earlier in the day but denies drinking any water. Patient reports that it is not unusual for him to drink upwards of 20 beers at a time; however, he denies drinking on a daily basis. Denies history of alcohol withdrawal seizures and DTs. Additionally, patient reports having severe spasms in bilateral legs a few night prior, which resolved after drinking a jar of pickle juice. Past Medical History:     Past Medical History:   Diagnosis Date    Eczema     History of cervical fracture     Primary hypertension         Past Surgical History:     Past Surgical History:   Procedure Laterality Date    TONSILLECTOMY          Medications Prior to Admission:     Prior to Admission medications    Medication Sig Start Date End Date Taking? Authorizing Provider   losartan (COZAAR) 50 MG tablet Take 1 tablet by mouth daily 9/6/22  Yes Josr Seals MD        Allergies:     Chantix [varenicline]    Social History:     Tobacco:    reports that he has been smoking cigarettes. He started smoking about 50 years ago. He has a 80.00 pack-year smoking history. He has never used smokeless tobacco.  Alcohol:      reports current alcohol use. Drug Use:  reports no history of drug use. Family History:     Family History   Problem Relation Age of Onset    Other Mother     Colon Cancer Father        REVIEW OF SYSTEMS     Review of Systems   Constitutional:  Negative for chills, diaphoresis and fever. HENT:  Negative for congestion and sore throat. Respiratory:  Negative for cough, shortness of breath and wheezing. Cardiovascular:  Negative for chest pain, palpitations and leg swelling. erythema or rash. Neurological:      Mental Status: He is alert and oriented to person, place, and time. Sensory: Sensory deficit (numbness sensation bilteral hands -  strong and equal) present. Motor: No seizure activity. Coordination: Coordination normal.   Psychiatric:         Behavior: Behavior normal.         Thought Content:  Thought content normal.     Investigations:      Laboratory Testing:  Recent Results (from the past 24 hour(s))   CBC with Auto Differential    Collection Time: 09/14/22  9:46 PM   Result Value Ref Range    WBC 10.5 3.5 - 11.0 k/uL    RBC 4.55 4.5 - 5.9 m/uL    Hemoglobin 15.1 13.5 - 17.5 g/dL    Hematocrit 43.3 41 - 53 %    MCV 95.2 80 - 100 fL    MCH 33.3 26 - 34 pg    MCHC 35.0 31 - 37 g/dL    RDW 13.5 11.5 - 14.9 %    Platelets 507 522 - 269 k/uL    MPV 7.3 6.0 - 12.0 fL    Seg Neutrophils 69 (H) 36 - 66 %    Lymphocytes 15 (L) 24 - 44 %    Monocytes 8 (H) 1 - 7 %    Eosinophils % 7 (H) 0 - 4 %    Basophils 1 0 - 2 %    Segs Absolute 7.30 1.3 - 9.1 k/uL    Absolute Lymph # 1.60 1.0 - 4.8 k/uL    Absolute Mono # 0.90 0.1 - 1.3 k/uL    Absolute Eos # 0.70 (H) 0.0 - 0.4 k/uL    Basophils Absolute 0.10 0.0 - 0.2 k/uL   Basic Metabolic Panel w/ Reflex to MG    Collection Time: 09/14/22  9:46 PM   Result Value Ref Range    Glucose 93 70 - 99 mg/dL    BUN 16 8 - 23 mg/dL    Creatinine 0.77 0.70 - 1.20 mg/dL    Calcium 9.3 8.6 - 10.4 mg/dL    Sodium 127 (L) 135 - 144 mmol/L    Potassium 3.8 3.7 - 5.3 mmol/L    Chloride 93 (L) 98 - 107 mmol/L    CO2 21 20 - 31 mmol/L    Anion Gap 13 9 - 17 mmol/L    GFR Non-African American >60 >60 mL/min    GFR African American >60 >60 mL/min    GFR Comment         Troponin    Collection Time: 09/14/22  9:46 PM   Result Value Ref Range    Troponin, High Sensitivity 7 0 - 22 ng/L   EKG 12 Lead    Collection Time: 09/14/22 10:25 PM   Result Value Ref Range    Ventricular Rate 81 BPM    Atrial Rate 81 BPM    P-R Interval 208 ms    QRS Duration 108 ms    Q-T Interval 388 ms    QTc Calculation (Bazett) 450 ms    P Axis 46 degrees    R Axis 34 degrees    T Axis 44 degrees   Troponin    Collection Time: 09/14/22 11:12 PM   Result Value Ref Range    Troponin, High Sensitivity 7 0 - 22 ng/L       Imaging/Diagnostics:  XR SHOULDER RIGHT (MIN 2 VIEWS)    Result Date: 9/14/2022  Right shoulder: No fracture or dislocation Chest radiograph: Small right pleural effusion. Linear atelectasis/scarring of both lungs. Focal consolidative changes at the bases, right greater than left side, may be suggestive of atelectasis. Superimposed infiltrate cannot be excluded. CT HEAD WO CONTRAST    Result Date: 9/14/2022  Head CT: No acute intracranial abnormality. No evidence for acute intracranial hemorrhage, territorial infarction or intracranial mass lesion. Cervical CT: No acute abnormality of the cervical spine. No acute fracture. At C5-C6, severe canal stenosis and severe bilateral neural foraminal narrowing. CT CERVICAL SPINE WO CONTRAST    Result Date: 9/14/2022  Head CT: No acute intracranial abnormality. No evidence for acute intracranial hemorrhage, territorial infarction or intracranial mass lesion. Cervical CT: No acute abnormality of the cervical spine. No acute fracture. At C5-C6, severe canal stenosis and severe bilateral neural foraminal narrowing. XR CHEST PORTABLE    Result Date: 9/14/2022  Right shoulder: No fracture or dislocation Chest radiograph: Small right pleural effusion. Linear atelectasis/scarring of both lungs. Focal consolidative changes at the bases, right greater than left side, may be suggestive of atelectasis. Superimposed infiltrate cannot be excluded.        Assessment :      Hospital Problems             Last Modified POA    * (Principal) Seizure-like activity ([facility] [address] 9/15/2022 Yes    Syncopal episodes 9/15/2022 Yes    Hypertension, essential 9/15/2022 Yes       Plan:     Patient status inpatient in the [facility] Unit/Step down    Seizure-like activity  -No prior history seizure disorder  -patient had syncopal episode, then brief period of unresponsiveness  --No tonic/clonic activity reported  -CT head - no acute intercranial abnormality  --No evidence for acute intracranial hemorrhage, infarction or mass lesion  -Neurology consulted in ED  --MRI brain and EEG in am  -Seizure precautions  -IV fluids  -Neuro checks q 4 hours  -Ativan IV PRN seizures     Syncopal episode  -Check orthostatic VS   -EKG - NSR- Normal ECG  -Trop HS - 7, x 2 readings  -check 2D Echocardiogram in am  -IVF - NS @ 75 ml/hr  -Neuro checks q 4 hours  -Check CBC, BMP, Mg, Phos, TSH    Right shoulder pain/bilateral hand numbness  -Reports new numbness in hands bilat  --Right > left  -Cap refill < 3 sec; hand grasp equal  -CT head no acute cranial abnormality  -Cervical CT-no acute abnormality; no acute fracture  -At C5-C6, severe canal stenosis and severe bilateral neural foraminal narrowing  -Right shoulder x-ray-no fracture or dislocation  -Neurology to determine additional testing for hand numbness    Hyponatremia  -Sodium level - 127 on arrival   -IV NS at 75ml/hr  -Monitor BMP    HTN  -Continue home dose Cozaar daily  -Monitor VS    Tobacco use   -smoking cessation education  -nicotine gum prn    Alcohol use  -Monitor for signs of withdrawal    Disposition 3 days      Consultations:   IP CONSULT TO NEUROLOGY  IP CONSULT TO INTERNAL MEDICINE    Patient is admitted as inpatient status because of co-morbidities listed above, severity of signs and symptoms as outlined, requirement for current medical therapies and most importantly because of direct risk to patient if care not provided in a hospital setting. Expected length of stay > 48 hours. ELVIRA Garcia - CNP  9/15/2022  1:40 AM    Copy sent to Dr. Meeta De La Rosa MD    Please note that this chart was generated using voice recognition Dragon dictation software.   Although every effort was

## 2022-09-15 NOTE — PROGRESS NOTES
Pharmacy Medication History Note      List of current medications patient is taking is complete. Source of information: [id number] Lupe Cook made to medication list:  Medications removed (include reason, ex. therapy complete or physician discontinued, noncompliance):  None     Medications added/doses adjusted:  Losartan 50 mg daily     Other notes (ex. Recent course of antibiotics, Coumadin dosing):  None     Current Home Medication List at Time of Admission:  Prior to Admission medications    Medication Sig Start Date End Date Taking? Authorizing Provider   losartan (COZAAR) 50 MG tablet Take 1 tablet by mouth daily 9/6/22  Yes Asuncion Kat MD       Please let me know if you have any questions about this encounter. Thank you!     Electronically signed by Ashli Fernandez, [address] [facility] on 9/15/2022 at 9:16 AM

## 2022-09-15 NOTE — PROCEDURES
[address] [facility]                 [address] [facility], [address]                          ELECTROENCEPHALOGRAM REPORT    PATIENT NAME: Dinesh Cabral                  :        1960  MED REC NO:   [id number]                              ROOM:       2093  ACCOUNT NO:   [de-identified]                           ADMIT DATE: 2022  PROVIDER:     Mala Hahn MD    DATE OF EE/15/2022    ATTENDING OF RECORD:  Gill Prater MD    REASON FOR STUDY:  This 26-year-old gentleman with seizure. MEDICATIONS:  Include Ativan, Lovenox, and Cozaar. EEG FINDINGS:  This is an 23-EEG channel and one EKG channel recording  performed in a patient described to be awake, drowsy, and asleep. The  patient shows normal waking rhythms. Background activity consists of  well-regulated 8 Hz activity in the 40-60 microvolt range, more  prominent in the posterior head area showing good reactivity to eye  opening and closing. Over the anterior head regions, there are 15-20 Hz  activity in the 20-30 microvolt range. With drowsiness and sleep, there  is further intrusion of slower frequencies in the theta and delta band. This record is lateralized or epileptiform. Hyperventilation is not  performed. Photic stimulation shows no change in the record. IMPRESSION:  This EEG is within normal limits for an awake, drowsy, and  sleepy patient. No lateralized or epileptiform disturbance is seen.         Lexie Heath MD    D: 09/15/2022 16:02:59       T: 09/15/2022 16:05:35     [id number]  Job#: [id number]     Doc#: [id number]    CC:

## 2022-09-15 NOTE — CONSULTS
58 yo male with seizure . Yesterday he drank 15 to 18 beers along with smoking marihuana . He stood up taking two steps falling on his face . Daughter rolled him over taking in whiff of air with eyes rolling to back of head with foaming at mouth taking 8 to 10 minutes to wake up  There was loss of bladder control . Head CT normal . CT cervical spine with severe stenosis C5-6 . Cardiac 2 D echo mild LVH EF 50-55 % . Trivial MR and TR . He is complaining of right shoulder pain . EEG today is normal .MRI of Head with minimal chronic small vessel ischemia  . He denies seizure in the past . He does have frontal scalp abrasion. He denies focal motor ,sensory , bulbar or visual complaint . Testing  Head CT normal . CT cervical spine with severe stenosis C5-6 . Cardiac 2 D echo mild LVH EF 50-55 % . Trivial MR and TR. Right shoulder xray with no fracture .  EEG today is normal      Past Medical History:   Diagnosis Date    Eczema     History of cervical fracture     Primary hypertension        Past Surgical History:   Procedure Laterality Date    TONSILLECTOMY         Family History   Problem Relation Age of Onset    Other Mother     Colon Cancer Father        Social History     Socioeconomic History    Marital status:    Tobacco Use    Smoking status: Every Day     Packs/day: 2.00     Years: 40.00     Pack years: 80.00     Types: Cigarettes     Start date: 8/7/1972    Smokeless tobacco: Never    Tobacco comments:     tried Chantix   Substance and Sexual Activity    Alcohol use: Yes     Comment: pt states he drinks only 1-2 year     Drug use: No    Sexual activity: Not Currently     Social Determinants of Health     Financial Resource Strain: Low Risk     Difficulty of Paying Living Expenses: Not hard at all   Food Insecurity: No Food Insecurity    Worried About Running Out of Food in the Last Year: Never true    Ran Out of Food in the Last Year: Never true   Transportation Needs: No Transportation Needs    Lack of Negative for hemoptysis and sputum  Cardiovascular                Negative for orthopnea, claudication and PND  Gastrointestinal               Negative for abdominal pain, diarrhea, blood in stool  Musculoskeletal               Negative for joint pain, negative for myalgia  Skin                                 Negative for rash or itching  Endo/heme/allergies       Negative for polydipsia, environmental allergy  Psychiatric                       Negative for suicidal ideation. Patient is not anxious    Vitals:    09/15/22 1400   BP: 120/77   Pulse: 92   Resp: 18   Temp: 97.5 °F (36.4 °C)   SpO2: 97%     Admission weight: 197 lb (89.4 kg)    Neurological Examination  Constitutional .General exam well groomed   Head/ Ears /Nose/Throat/external ear . Frontal head bandage   Neck and thyroid . Normal size. No bruits  Respiratory . Breathsounds clear bilaterally  Cardiovascular: Auscultation of heart with regular rate and rhythm   Musculoskeletal. Muscle bulk and tone normal                                                           Muscle strength 5/5 strength throughout                                                                                No dysmetria or dysdiadokinesis  No tremor   Orientation Alert and oriented x 3   Attention and concentration normal  Short term memory normal  Language process and speech normal . No aphasia   Cranial nerve 2 normal acuety and visual fields  Cranial nerve 3, 4 and 6 . Extraocular muscles are intact . Pupils are equal and reactive   Cranial nerve 5 . Intact corneal reflex. Normal facial sensation  Cranial nerve 7 normal exam   Cranial nerve 8. Grossly intact hearing   Cranial nerve 9 and 10. Symmetric palate elevation   Cranial nerve 11 , 5 out of 5 strength   Cranial Nerve 12 midline tongue . No atrophy  Sensation . Normal pinprick and light touch   Deep Tendon Reflexes normal  Plantar response flexor bilaterally    Assessment :    Seizure .  Alcohol

## 2022-09-15 NOTE — ED NOTES
Report given to , RN from PCU. Report method by phone   The following was reviewed with receiving RN:   Current vital signs:  /82   Pulse 69   Temp 98.8 °F (37.1 °C) (Oral)   Resp 13   Wt 197 lb (89.4 kg)   SpO2 94%   BMI 28.27 kg/m²                MEWS Score: 0     Any medication or safety alerts were reviewed. Any pending diagnostics and notifications were also reviewed, as well as any safety concerns or issues, abnormal labs, abnormal imaging, and abnormal assessment findings. Questions were answered.             Shaye Khan  09/15/22 0732

## 2022-09-15 NOTE — CARE COORDINATION
CASE MANAGEMENT NOTE:    Admission Date:  9/14/2022 Mili Anton is a 57 y.o.  male    Admitted for : Seizure-like activity ([facility] [R56.9]  New onset seizure ([facility] [R56.9]    Met with:  Patient    PCP:  [address] [place] [address] Road      Is patient alert and oriented at time of discussion:  Yes    Current Residence/ Living Arrangements:  independently at home alone, works part time as fisherman             Current Services PTA:  No    Does patient go to outpatient dialysis: No  If yes, location and chair time: NA  Who is their nephrologist? NA    Is patient agreeable to VNS: No    Freedom of choice provided:  No    List of [address] provided: No    VNS chosen:  No    DME:  none    Home Oxygen: No    Nebulizer: No    CPAP/BIPAP: No    Supplier: N/A    Potential Assistance Needed: No    SNF needed: No    Freedom of choice and list provided: No    Pharmacy:  [facility]       Is patient currently receiving oral anticoagulation therapy? No    Is the Patient an YUSEF ROSARIO [facility] [place] with Readmission Risk Score greater than 14%? No  If yes, pt needs a follow up appointment made within 7 days. Family Members/Caregivers that pt would like involved in their care:    Yes    If yes, list name here:  dtr Mackenziejodee Mclain    Transportation Provider:  Family             Discharge Plan:  9/15/22 [facility] From home with SO , independent and still works. DME: none VNS: none Pt denies needs for discharge. New onset seizures today after drinking 15-18 beers and smoked marijuana. New consult for neurology, MRI Brain, EEG.  Following for needs//JF                  Electronically signed by: Nidia Khan RN on 9/15/2022 at 10:03 AM

## 2022-09-15 NOTE — ED TRIAGE NOTES
Mode of arrival (squad #, walk in, police, etc) : Squad         Chief complaint(s): Loss of conscious and fall at home         Arrival Note (brief scenario, treatment PTA, etc). : Brought by ambulance with complains of loss of consciousness and  history of fall at home , trauma to head . Laceration on  head. Pt was  drinking alcohol  and tried to go wash room and  feel down , do not remember the event, family member called  44 and brrought to ED by ambulance . The patient  Daughter told  EMS staff that pt exhibited  seizures like  activity and  observer  some froth coming from  Mouth. Pt came with C  Collar applied         C= \"Have you ever felt that you should Cut down on your drinking? \"  No  A= \"Have people Annoyed you by criticizing your drinking? \"  No  G= \"Have you ever felt bad or Guilty about your drinking? \"  No  E= \"Have you ever had a drink as an Eye-opener first thing in the morning to steady your nerves or to help a hangover? \"  No      Deferred []      Reason for deferring: N/A    *If yes to two or more: probable alcohol abuse. *

## 2022-09-15 NOTE — ED NOTES
Pt, reassessed by Dr Florencia Middleton after CT and removed  C collar.  Plan for admission  Family explained      Richard Driscoll RN  09/14/22 5234

## 2022-09-15 NOTE — ED PROVIDER NOTES
[facility] EMERGENCY DEPARTMENT ENCOUNTER   ATTENDING ATTESTATION     Pt Name: Mikel Diana  MRN: [id number]  [place] 1960  Date of evaluation: 9/14/22       Mikel Diana is a 57 y.o. male who presents with Loss of Consciousness      MDM: Patient loss of consciousness after drinking alcohol and smoking marijuana. Family describes seizure-like episode this is the patient's first time having seizure-like activity. Patient admitted for further work-up after discussion with neurology      Vitals:   Vitals:    09/14/22 2055   BP: 119/68   Pulse: 79   Resp: 20   SpO2: 91%   Weight: 197 lb (89.4 kg)         I personally saw and examined the patient. I have reviewed and agree with the resident's findings, including all diagnostic interpretations and treatment plan as written. I was present for the key portions of any procedures performed and the inclusive time noted for any critical care statement.       The care is provided during an unprecedented national emergency due to the novel coronavirus, COVID [facility],   Attending Emergency Physician          Jordan Shaffer DO  09/15/22 4545

## 2022-09-16 VITALS
TEMPERATURE: 97.9 F | HEIGHT: 70 IN | OXYGEN SATURATION: 94 % | WEIGHT: 199.3 LBS | HEART RATE: 68 BPM | SYSTOLIC BLOOD PRESSURE: 134 MMHG | BODY MASS INDEX: 28.53 KG/M2 | RESPIRATION RATE: 18 BRPM | DIASTOLIC BLOOD PRESSURE: 86 MMHG

## 2022-09-16 LAB
ALBUMIN SERPL-MCNC: 3.6 G/DL (ref 3.5–5.2)
ALP BLD-CCNC: 50 U/L (ref 40–129)
ALT SERPL-CCNC: 13 U/L (ref 5–41)
ANION GAP SERPL CALCULATED.3IONS-SCNC: 9 MMOL/L (ref 9–17)
AST SERPL-CCNC: 23 U/L
BILIRUB SERPL-MCNC: 0.5 MG/DL (ref 0.3–1.2)
BUN BLDV-MCNC: 15 MG/DL (ref 8–23)
CALCIUM SERPL-MCNC: 8.7 MG/DL (ref 8.6–10.4)
CHLORIDE BLD-SCNC: 111 MMOL/L (ref 98–107)
CO2: 23 MMOL/L (ref 20–31)
CREAT SERPL-MCNC: 0.76 MG/DL (ref 0.7–1.2)
GFR AFRICAN AMERICAN: >60 ML/MIN
GFR NON-AFRICAN AMERICAN: >60 ML/MIN
GFR SERPL CREATININE-BSD FRML MDRD: ABNORMAL ML/MIN/{1.73_M2}
GLUCOSE BLD-MCNC: 89 MG/DL (ref 70–99)
POTASSIUM SERPL-SCNC: 4 MMOL/L (ref 3.7–5.3)
SODIUM BLD-SCNC: 143 MMOL/L (ref 135–144)
TOTAL PROTEIN: 6.2 G/DL (ref 6.4–8.3)

## 2022-09-16 PROCEDURE — 80053 COMPREHEN METABOLIC PANEL: CPT

## 2022-09-16 PROCEDURE — 36415 COLL VENOUS BLD VENIPUNCTURE: CPT

## 2022-09-16 PROCEDURE — 99239 HOSP IP/OBS DSCHRG MGMT >30: CPT | Performed by: INTERNAL MEDICINE

## 2022-09-16 NOTE — PROGRESS NOTES
Writer eating breakfast. Denies need to take morning meds here. Ride will be here at 0830. IV d/c'd. Serg applied. Discharge teaching and instructions to quit smoking with escript for nicotine gum at home pharmacy. Patient voiced understanding regarding prescriptions, follow up appointments, and care of self at home.

## 2022-09-16 NOTE — DISCHARGE SUMMARY
[facility] Internal Medicine  Masood Nava MD; Carolina Brooke MD; Shireen Iraheta MD; MD Taylor Cabral MD; Yariel Carlisle MD      [facility]  [facility]    Discharge Summary     Patient ID: Augustin Alas  :  1960   MRN: [id number]     ACCOUNT:  [de-identified]   Patient's PCP: Eunice Perera MD  Admit Date: 2022   Discharge Date: 2022     Length of Stay: 2  Code Status:  Full Code  Admitting Physician: Shawna David MD  Discharge Physician: Yariel Carlisle MD     Active Discharge Diagnoses:     Hospital Problem Lists:  Principal Problem:    Seizure [facility])  Active Problems:    Syncopal episodes    Acute pain of right shoulder    Numbness and tingling in both hands    Alcoholic intoxication with complication ([facility]    Hypertension, essential  Resolved Problems:    * No resolved hospital problems. *      Admission Condition:  critical     Discharged Condition: stable    Hospital Stay:     Hospital Course:  Augustin Alas is a 57 y.o. male who was admitted for the management of   Seizure [facility]) , presented to ER with Loss of Consciousness    Augustin Alas is a 57 y.o. Non- / non  male who presents with Loss of Consciousness  and is admitted to the hospital for the management of Seizure-like activity ([facility] [address] According to patient, he drank about 15-18 beers today and smoked a little marijuana, then got up to go to the bathroom, and woke up on the floor. Daughter reports the patient stood up took 2 steps and fell forward on his face. Daughter reports that she immediately rolled him over onto his back, at which time he \"sucked in a large amount of air and his eyes rolled back in his head. \"  Daughter reports that patient was foaming at the mouth and states that Ralph Quezada was gone for a few minutes. \"  Patient has no recollection of the incident.   Denies any prodromal symptoms such as dizziness, lightheadedness, and chest pain prior to syncopal event. Denies previous episodes of similar episodes. Symptoms are associated with loss of bladder control; patient incontinent of urine while unconscious. Additionally, patient reports pain in right shoulder and numbness in both hands; right greater than left. He is noted to have a large abrasion on the left side of his forehead and an abrasion on the top of his head. Denies fever, chills, chest pain, cough, abdominal pain, nausea, vomiting, diarrhea, and urinary symptoms. There are no aggravating or alleviating factors. Symptoms are reported as constant and moderate. Patient reports that he woke this morning and drink several cups of coffee, then later started drinking beer. States that he ate a sandwich earlier in the day but denies drinking any water. Patient reports that it is not unusual for him to drink upwards of 20 beers at a time; however, he denies drinking on a daily basis. Denies history of alcohol withdrawal seizures and DTs. Additionally, patient reports having severe spasms in bilateral legs a few night prior, which resolved after drinking a jar of pickle juice.       Seen by neurology , ok to DC , no anti seizure meds  MRI/Echo done,   MRI Nml,   Echo , EF 97-54%,   Alcoholic cardiomyopathy   Will follow with PCP and requested to see cardiology  n   Strongly advised to quit drinking and smoking   50 pack yr hx of smoking   COPD , needs PFTS       Review of system:  Denies any nausea vomiting fever chills,  Denies any headaches or blurred vision,  Denies any chest pain shortness of pain orthopnea,   Denies any cough phlegm hemoptysis,  Denies any abdominal pain diarrhea constipation,  Denies any tingling tingling numbness weakness of arms or legs,   Skin no rash,    On examination,  Alert awake oriented x3,  S1-S2 present,  CTA bilateral,  Abdomen soft nontender nondistended bowel sounds present   Extremity no edema no calf tenderness,,  Skin no rash  CNS no focal neurological deficits             Significant therapeutic interventions:     Significant Diagnostic Studies:   Labs / Micro:  CBC:   Lab Results   Component Value Date/Time    WBC 10.5 09/14/2022 09:46 PM    RBC 4.55 09/14/2022 09:46 PM    HGB 15.1 09/14/2022 09:46 PM    HCT 43.3 09/14/2022 09:46 PM    MCV 95.2 09/14/2022 09:46 PM    MCH 33.3 09/14/2022 09:46 PM    MCHC 35.0 09/14/2022 09:46 PM    RDW 13.5 09/14/2022 09:46 PM     09/14/2022 09:46 PM     BMP:    Lab Results   Component Value Date/Time    GLUCOSE 88 09/15/2022 05:57 AM     09/15/2022 05:57 AM    K 4.7 09/15/2022 05:57 AM     09/15/2022 05:57 AM    CO2 19 09/15/2022 05:57 AM    ANIONGAP 13 09/15/2022 05:57 AM    BUN 11 09/15/2022 05:57 AM    CREATININE 0.61 09/15/2022 05:57 AM    BUNCRER NOT REPORTED 02/04/2019 09:55 AM    CALCIUM 9.1 09/15/2022 05:57 AM    LABGLOM >60 09/15/2022 05:57 AM    GFRAA >60 09/15/2022 05:57 AM    GFR      09/15/2022 05:57 AM     HFP:    Lab Results   Component Value Date/Time    PROT 7.4 09/15/2022 05:57 AM     CMP:    Lab Results   Component Value Date/Time    GLUCOSE 88 09/15/2022 05:57 AM     09/15/2022 05:57 AM    K 4.7 09/15/2022 05:57 AM     09/15/2022 05:57 AM    CO2 19 09/15/2022 05:57 AM    BUN 11 09/15/2022 05:57 AM    CREATININE 0.61 09/15/2022 05:57 AM    ANIONGAP 13 09/15/2022 05:57 AM    ALKPHOS 62 09/15/2022 05:57 AM    ALT 13 09/15/2022 05:57 AM    AST 32 09/15/2022 05:57 AM    BILITOT 0.5 09/15/2022 05:57 AM    LABALBU 4.2 09/15/2022 05:57 AM    ALBUMIN 1.2 05/10/2022 10:40 AM    LABGLOM >60 09/15/2022 05:57 AM    GFRAA >60 09/15/2022 05:57 AM    GFR      09/15/2022 05:57 AM    PROT 7.4 09/15/2022 05:57 AM    CALCIUM 9.1 09/15/2022 05:57 AM     PT/INR:  No results found for: PTINR, PROTIME, INR  PTT: No results found for: APTT  FLP:    Lab Results   Component Value Date/Time    CHOL 188 05/10/2022 10:40 AM    TRIG 48 05/10/2022 10:40 AM HDL 77 05/10/2022 10:40 AM     U/A:    Lab Results   Component Value Date/Time    COLORU YELLOW 02/04/2019 09:55 AM    TURBIDITY TURBID 02/04/2019 09:55 AM    SPECGRAV 1.020 02/04/2019 09:55 AM    HGBUR NEGATIVE 02/04/2019 09:55 AM    PHUR 6.0 02/04/2019 09:55 AM    PROTEINU NEGATIVE 02/04/2019 09:55 AM    GLUCOSEU NEGATIVE 02/04/2019 09:55 AM    KETUA NEGATIVE 02/04/2019 09:55 AM    BILIRUBINUR NEGATIVE 02/04/2019 09:55 AM    UROBILINOGEN Normal 02/04/2019 09:55 AM    NITRU NEGATIVE 02/04/2019 09:55 AM    LEUKOCYTESUR NEGATIVE 02/04/2019 09:55 AM     TSH:    Lab Results   Component Value Date/Time    TSH 0.56 09/15/2022 05:57 AM          Radiology:  XR SHOULDER RIGHT (MIN 2 VIEWS)    Result Date: 9/14/2022  Right shoulder: No fracture or dislocation Chest radiograph: Small right pleural effusion. Linear atelectasis/scarring of both lungs. Focal consolidative changes at the bases, right greater than left side, may be suggestive of atelectasis. Superimposed infiltrate cannot be excluded. CT HEAD WO CONTRAST    Result Date: 9/14/2022  Head CT: No acute intracranial abnormality. No evidence for acute intracranial hemorrhage, territorial infarction or intracranial mass lesion. Cervical CT: No acute abnormality of the cervical spine. No acute fracture. At C5-C6, severe canal stenosis and severe bilateral neural foraminal narrowing. CT CERVICAL SPINE WO CONTRAST    Result Date: 9/14/2022  Head CT: No acute intracranial abnormality. No evidence for acute intracranial hemorrhage, territorial infarction or intracranial mass lesion. Cervical CT: No acute abnormality of the cervical spine. No acute fracture. At C5-C6, severe canal stenosis and severe bilateral neural foraminal narrowing. XR CHEST PORTABLE    Result Date: 9/14/2022  Right shoulder: No fracture or dislocation Chest radiograph: Small right pleural effusion. Linear atelectasis/scarring of both lungs.  Focal consolidative changes at the bases, right greater than left side, may be suggestive of atelectasis. Superimposed infiltrate cannot be excluded. MRI BRAIN WO CONTRAST    Result Date: 9/15/2022  No acute intracranial abnormality. Minimal parenchymal volume loss. Minimal chronic microvascular disease. RECOMMENDATIONS: Unavailable       Consultations:    Consults:     Final Specialist Recommendations/Findings:   IP CONSULT TO NEUROLOGY  IP CONSULT TO INTERNAL MEDICINE      The patient was seen and examined on day of discharge and this discharge summary is in conjunction with any daily progress note from day of discharge. Discharge plan:     Disposition: Home    Physician Follow Up:     Tyshawn Reynaga MD  [facility] [address]  [address] [facility] [address]  [phone]    Follow up      Tyshawn Reynaga, [address] [facility] [address] [place] [address]  [phone]             Requiring Further Evaluation/Follow Up POST HOSPITALIZATION/Incidental Findings:     Diet: regular diet    Activity: As tolerated    Instructions to Patient:     Discharge Medications:      Medication List        START taking these medications      nicotine polacrilex 4 MG gum  Commonly known as: NICORETTE  Take 1 each by mouth every hour as needed for Smoking cessation            CONTINUE taking these medications      losartan 50 MG tablet  Commonly known as: COZAAR  Take 1 tablet by mouth daily               Where to Get Your Medications        These medications were sent to [facility] [id number] [place] [address]      Phone: [phone]   nicotine polacrilex 4 MG gum         No discharge procedures on file. Time Spent on discharge is  34 mins in patient examination, evaluation, counseling as well as medication reconciliation, prescriptions for required medications, discharge plan and follow up.     Electronically signed by   Michael Salazar MD  9/16/2022  6:46 AM      Thank you Dr. Tyshawn Reynaga MD for the opportunity to be involved in this patient's care. Please note that this chart was generated using voice recognition Dragon dictation software. Although every effort was made to ensure the accuracy of this automated transcription, some errors in transcription may have occurred.

## 2022-09-16 NOTE — PLAN OF CARE
Problem: Discharge Planning  Goal: Discharge to home or other facility with appropriate resources  9/16/2022 0405 by Xander Daniels RN  Outcome: Progressing     Problem: Pain  Goal: Verbalizes/displays adequate comfort level or baseline comfort level  9/16/2022 0405 by Xander Daniels RN  Outcome: Progressing     Problem: Safety - Adult  Goal: Free from fall injury  9/16/2022 0405 by Xander Daniels RN  Outcome: Progressing   The patient remained free from falls this shift, call light within reach, bed in locked and lowest position. Side rails up x2. Continue to monitor closely.     Problem: ABCDS Injury Assessment  Goal: Absence of physical injury  9/16/2022 0405 by Xander Daniels RN  Outcome: Progressing

## 2022-09-17 NOTE — PROGRESS NOTES
Physician Progress Note      Todd Polk  CSN #:                  [id number]  :                       1960  ADMIT DATE:       2022 8:55 PM  [address] [facility] [place] DATE:        2022 8:40 AM  RESPONDING  PROVIDER #:        Georgina Alvarez MD          QUERY TEXT:    Pt presented s/p LOC and seizure/ seizure like activity. Pt reported   consumption of 15-18 beers in addition to smoking Marijuana followed by   witnessed LOC and seizure activity described as ?eyes rolling back, foaming   from mouth, loss of bladder control? [place] LOC lasted roughly 10 min, pt had no   recollection of incident. After further review, please indicate the likely   etiology of this patients? seizures as: The medical record reflects the following:  -Risk Factors: Alcohol dependance/ intoxication. HTN, Drug use  -Clinical Indicators: Pt reports no prior hx of seizure, no previous   experience with of alcohol withdrawal  or alcohol/ intoxication related issues. Witnessed seizure- like activity by   patients dtr. Reports pt had a  + LOC for roughly 10 min. Pt suddenly fell to the ground, eyes rolled back and   foaming from mouth. Associated with loss of bladder control. MRI, CT, EEG, and neurological   assessment conclude negative  findings. Neuro evaluation and notes ? Seizure . Alcohol intoxication . Fall   with head scalp abrasion. No  seizure medication with first time event. DC tomorrow. ?  -Treatment: Admission, Neuro consultant, EEG, MRI  Options provided:  -- Seizure due to alcohol dependence  -- Seizure due to alcohol intoxication  -- Seizure due to alcohol withdrawal  -- Psychogenic nonepileptic attacks  -- Seizure due to, please specify.   -- Defer to Neurology consult  -- Other - I will add my own diagnosis  -- Disagree - Not applicable / Not valid  -- Disagree - Clinically unable to determine / Unknown  -- Refer to Clinical Documentation Reviewer    PROVIDER RESPONSE TEXT:    This patient?s seizure was likely due to alcohol intoxication.     Query created by: Lynn Sun on 9/17/2022 1:44 AM      Electronically signed by:  Shweta Rabago MD 9/17/2022 1:17 PM

## 2022-09-19 ENCOUNTER — TELEPHONE (OUTPATIENT)
Dept: INTERNAL MEDICINE CLINIC | Age: 62
End: 2022-09-19

## 2022-09-19 NOTE — TELEPHONE ENCOUNTER
[facility] Transitions Initial Follow Up Call    Outreach made within 2 business days of discharge: Yes    Patient: Germaine Chiu Patient : 1960   MRN: [id number]  Reason for Admission: Seizures   Discharge Date: 22       Spoke with: Patient     Discharge department/facility: [facility]     TCM Interactive Patient Contact:  Was patient able to fill all prescriptions: Yes  Was patient instructed to bring all medications to the follow-up visit: Yes  Is patient taking all medications as directed in the discharge summary? Yes  Does patient understand their discharge instructions: Yes  Does patient have questions or concerns that need addressed prior to 7-14 day follow up office visit: no - patient states he does not want a follow up at this time    Scheduled appointment with PCP within 7-14 days    Follow Up  No future appointments.     Ashok Rios MA

## 2022-11-08 DIAGNOSIS — I10 HYPERTENSION, ESSENTIAL: ICD-10-CM

## 2022-11-08 RX ORDER — LOSARTAN POTASSIUM 50 MG/1
50 TABLET ORAL DAILY
Qty: 30 TABLET | Refills: 1 | Status: SHIPPED | OUTPATIENT
Start: 2022-11-08

## 2022-11-08 NOTE — TELEPHONE ENCOUNTER
Joann Syed is calling to request a refill on the following medication(s):    Last Visit Date (If Applicable):  58/98/6320    Next Visit Date:    Visit date not found    Medication Request:  Requested Prescriptions     Pending Prescriptions Disp Refills    losartan (COZAAR) 50 MG tablet 30 tablet 1     Sig: Take 1 tablet by mouth daily

## 2023-01-10 DIAGNOSIS — I10 HYPERTENSION, ESSENTIAL: ICD-10-CM

## 2023-01-10 RX ORDER — LOSARTAN POTASSIUM 50 MG/1
50 TABLET ORAL DAILY
Qty: 30 TABLET | Refills: 1 | Status: SHIPPED | OUTPATIENT
Start: 2023-01-10

## 2023-01-10 NOTE — TELEPHONE ENCOUNTER
Patient's insurance has changed and the needs to go to new pharmacy.   Minoxidil Counseling: Minoxidil is a topical medication which can increase blood flow where it is applied. It is uncertain how this medication increases hair growth. Side effects are uncommon and include stinging and allergic reactions.

## 2023-03-13 DIAGNOSIS — I10 HYPERTENSION, ESSENTIAL: ICD-10-CM

## 2023-03-13 RX ORDER — LOSARTAN POTASSIUM 50 MG/1
50 TABLET ORAL DAILY
Qty: 30 TABLET | Refills: 1 | Status: SHIPPED | OUTPATIENT
Start: 2023-03-13

## 2023-05-11 DIAGNOSIS — I10 HYPERTENSION, ESSENTIAL: ICD-10-CM

## 2023-05-12 RX ORDER — LOSARTAN POTASSIUM 50 MG/1
50 TABLET ORAL DAILY
Qty: 30 TABLET | Refills: 1 | Status: SHIPPED | OUTPATIENT
Start: 2023-05-12

## 2023-07-08 DIAGNOSIS — I10 HYPERTENSION, ESSENTIAL: ICD-10-CM

## 2023-07-10 RX ORDER — LOSARTAN POTASSIUM 50 MG/1
TABLET ORAL
Qty: 30 TABLET | Refills: 0 | OUTPATIENT
Start: 2023-07-10

## 2023-07-18 DIAGNOSIS — I10 HYPERTENSION, ESSENTIAL: ICD-10-CM

## 2023-07-18 RX ORDER — LOSARTAN POTASSIUM 50 MG/1
50 TABLET ORAL DAILY
Qty: 30 TABLET | Refills: 1 | Status: SHIPPED | OUTPATIENT
Start: 2023-07-18

## 2023-07-18 NOTE — TELEPHONE ENCOUNTER
Patient has an upcoming appt on 8/16/23 please send 1 month supply to pharmacy.     Patient is getting ready to leave on 2 wk vacation on Friday

## 2023-09-18 ENCOUNTER — OFFICE VISIT (OUTPATIENT)
Dept: INTERNAL MEDICINE CLINIC | Age: 63
End: 2023-09-18
Payer: COMMERCIAL

## 2023-09-18 VITALS
HEART RATE: 80 BPM | OXYGEN SATURATION: 94 % | WEIGHT: 197 LBS | BODY MASS INDEX: 28.2 KG/M2 | HEIGHT: 70 IN | SYSTOLIC BLOOD PRESSURE: 130 MMHG | DIASTOLIC BLOOD PRESSURE: 86 MMHG

## 2023-09-18 DIAGNOSIS — G62.1 ALCOHOL-INDUCED POLYNEUROPATHY (HCC): ICD-10-CM

## 2023-09-18 DIAGNOSIS — S05.92XA EYE INJURY, NON-PENETRATING, LEFT, INITIAL ENCOUNTER: ICD-10-CM

## 2023-09-18 DIAGNOSIS — Z91.81 AT HIGH RISK FOR FALLS: ICD-10-CM

## 2023-09-18 DIAGNOSIS — Z87.891 PERSONAL HISTORY OF TOBACCO USE: ICD-10-CM

## 2023-09-18 DIAGNOSIS — Z12.11 SCREENING FOR MALIGNANT NEOPLASM OF COLON: ICD-10-CM

## 2023-09-18 DIAGNOSIS — I10 HYPERTENSION, ESSENTIAL: ICD-10-CM

## 2023-09-18 DIAGNOSIS — R56.9 SEIZURE (HCC): ICD-10-CM

## 2023-09-18 DIAGNOSIS — F10.929 ALCOHOLIC INTOXICATION WITH COMPLICATION (HCC): Primary | ICD-10-CM

## 2023-09-18 DIAGNOSIS — F18.10 ABUSE OF SMOKED SUBSTANCE (HCC): ICD-10-CM

## 2023-09-18 PROCEDURE — 99214 OFFICE O/P EST MOD 30 MIN: CPT | Performed by: INTERNAL MEDICINE

## 2023-09-18 PROCEDURE — 1123F ACP DISCUSS/DSCN MKR DOCD: CPT | Performed by: INTERNAL MEDICINE

## 2023-09-18 PROCEDURE — 3079F DIAST BP 80-89 MM HG: CPT | Performed by: INTERNAL MEDICINE

## 2023-09-18 PROCEDURE — 3075F SYST BP GE 130 - 139MM HG: CPT | Performed by: INTERNAL MEDICINE

## 2023-09-18 PROCEDURE — G0296 VISIT TO DETERM LDCT ELIG: HCPCS | Performed by: INTERNAL MEDICINE

## 2023-09-18 RX ORDER — LOSARTAN POTASSIUM 50 MG/1
50 TABLET ORAL DAILY
Qty: 30 TABLET | Refills: 1 | Status: SHIPPED | OUTPATIENT
Start: 2023-09-18

## 2023-09-18 SDOH — ECONOMIC STABILITY: FOOD INSECURITY: WITHIN THE PAST 12 MONTHS, THE FOOD YOU BOUGHT JUST DIDN'T LAST AND YOU DIDN'T HAVE MONEY TO GET MORE.: NEVER TRUE

## 2023-09-18 SDOH — ECONOMIC STABILITY: INCOME INSECURITY: HOW HARD IS IT FOR YOU TO PAY FOR THE VERY BASICS LIKE FOOD, HOUSING, MEDICAL CARE, AND HEATING?: NOT HARD AT ALL

## 2023-09-18 SDOH — ECONOMIC STABILITY: HOUSING INSECURITY
IN THE LAST 12 MONTHS, WAS THERE A TIME WHEN YOU DID NOT HAVE A STEADY PLACE TO SLEEP OR SLEPT IN A SHELTER (INCLUDING NOW)?: NO

## 2023-09-18 SDOH — ECONOMIC STABILITY: FOOD INSECURITY: WITHIN THE PAST 12 MONTHS, YOU WORRIED THAT YOUR FOOD WOULD RUN OUT BEFORE YOU GOT MONEY TO BUY MORE.: NEVER TRUE

## 2023-09-18 ASSESSMENT — PATIENT HEALTH QUESTIONNAIRE - PHQ9
SUM OF ALL RESPONSES TO PHQ9 QUESTIONS 1 & 2: 0
SUM OF ALL RESPONSES TO PHQ QUESTIONS 1-9: 0
2. FEELING DOWN, DEPRESSED OR HOPELESS: 0
SUM OF ALL RESPONSES TO PHQ QUESTIONS 1-9: 0
1. LITTLE INTEREST OR PLEASURE IN DOING THINGS: 0

## 2023-09-18 ASSESSMENT — ENCOUNTER SYMPTOMS
EYE PAIN: 1
GASTROINTESTINAL NEGATIVE: 1
RESPIRATORY NEGATIVE: 1
EYE ITCHING: 1

## 2023-09-18 NOTE — PROGRESS NOTES
Subjective:      Patient ID: Anurag Allen is a 70 y.o. male. Today he is symptomatic because he had a foreign body in his right eye. History of significant alcohol consumption and has not seen by me for 10 to 15 years he has been followed by nurse practitioner another physician in my office. He stated that he has reduced his alcohol consumption but not eliminated. Stated that a few years ago his son  and also his long-term living girlfriend  and he was grieving        Review of Systems   Constitutional: Negative. HENT: Negative. Eyes:  Positive for pain and itching. Respiratory: Negative. Cardiovascular: Negative. Gastrointestinal: Negative. Endocrine: Negative. Genitourinary: Negative. Musculoskeletal: Negative. Neurological:  Positive for numbness. Poor hand  and numbness in both hands   Hematological: Negative. Psychiatric/Behavioral: Negative. Objective:   Physical Exam  Constitutional:       Appearance: Normal appearance. Eyes:      Extraocular Movements: Extraocular movements intact. Pupils: Pupils are equal, round, and reactive to light. Comments: He has right eye conjunctivitis due to fish scales   Neck:      Vascular: No carotid bruit. Cardiovascular:      Rate and Rhythm: Normal rate. Heart sounds: No murmur heard. Pulmonary:      Effort: No respiratory distress. Breath sounds: No stridor. No wheezing or rhonchi. Abdominal:      General: Abdomen is flat. There is no distension. Palpations: There is no mass. Tenderness: There is no abdominal tenderness. Hernia: No hernia is present. 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 Left lower leg: No edema. Lymphadenopathy:      Cervical: No cervical adenopathy. Neurological:      General: No focal deficit present.       Mental Status: He is alert and oriented to person,

## 2023-11-14 ENCOUNTER — TELEPHONE (OUTPATIENT)
Dept: INTERNAL MEDICINE CLINIC | Age: 63
End: 2023-11-14

## 2023-11-24 DIAGNOSIS — I10 HYPERTENSION, ESSENTIAL: ICD-10-CM

## 2023-11-25 RX ORDER — LOSARTAN POTASSIUM 50 MG/1
50 TABLET ORAL DAILY
Qty: 30 TABLET | Refills: 1 | Status: SHIPPED | OUTPATIENT
Start: 2023-11-25

## 2024-01-29 DIAGNOSIS — I10 HYPERTENSION, ESSENTIAL: ICD-10-CM

## 2024-01-29 RX ORDER — LOSARTAN POTASSIUM 50 MG/1
50 TABLET ORAL DAILY
Qty: 30 TABLET | Refills: 1 | Status: SHIPPED | OUTPATIENT
Start: 2024-01-29

## 2024-03-29 DIAGNOSIS — I10 HYPERTENSION, ESSENTIAL: ICD-10-CM

## 2024-03-29 RX ORDER — LOSARTAN POTASSIUM 50 MG/1
50 TABLET ORAL DAILY
Qty: 30 TABLET | Refills: 0 | Status: SHIPPED | OUTPATIENT
Start: 2024-03-29

## 2024-03-29 NOTE — TELEPHONE ENCOUNTER
30-day refill provided, will need a repeat BMP to ensure creatinine and potassium normal prior to continuation of medication.

## 2024-05-01 DIAGNOSIS — I10 HYPERTENSION, ESSENTIAL: ICD-10-CM

## 2024-05-02 ENCOUNTER — OFFICE VISIT (OUTPATIENT)
Dept: INTERNAL MEDICINE CLINIC | Age: 64
End: 2024-05-02
Payer: COMMERCIAL

## 2024-05-02 VITALS
WEIGHT: 214 LBS | HEART RATE: 67 BPM | DIASTOLIC BLOOD PRESSURE: 84 MMHG | BODY MASS INDEX: 30.64 KG/M2 | OXYGEN SATURATION: 95 % | HEIGHT: 70 IN | SYSTOLIC BLOOD PRESSURE: 132 MMHG

## 2024-05-02 DIAGNOSIS — Z12.5 PROSTATE CANCER SCREENING: ICD-10-CM

## 2024-05-02 DIAGNOSIS — Z12.11 COLON CANCER SCREENING: ICD-10-CM

## 2024-05-02 DIAGNOSIS — F18.10 ABUSE OF SMOKED SUBSTANCE (HCC): ICD-10-CM

## 2024-05-02 DIAGNOSIS — G62.1 ALCOHOL-INDUCED POLYNEUROPATHY (HCC): ICD-10-CM

## 2024-05-02 DIAGNOSIS — I10 HYPERTENSION, ESSENTIAL: Primary | ICD-10-CM

## 2024-05-02 DIAGNOSIS — E55.9 VITAMIN D DEFICIENCY: ICD-10-CM

## 2024-05-02 DIAGNOSIS — F10.20 ALCOHOLISM (HCC): ICD-10-CM

## 2024-05-02 DIAGNOSIS — N52.9 ERECTILE DYSFUNCTION, UNSPECIFIED ERECTILE DYSFUNCTION TYPE: ICD-10-CM

## 2024-05-02 DIAGNOSIS — R73.9 HYPERGLYCEMIA: ICD-10-CM

## 2024-05-02 PROCEDURE — 3075F SYST BP GE 130 - 139MM HG: CPT | Performed by: INTERNAL MEDICINE

## 2024-05-02 PROCEDURE — 99214 OFFICE O/P EST MOD 30 MIN: CPT | Performed by: INTERNAL MEDICINE

## 2024-05-02 PROCEDURE — 3079F DIAST BP 80-89 MM HG: CPT | Performed by: INTERNAL MEDICINE

## 2024-05-02 PROCEDURE — 1123F ACP DISCUSS/DSCN MKR DOCD: CPT | Performed by: INTERNAL MEDICINE

## 2024-05-02 RX ORDER — LOSARTAN POTASSIUM 50 MG/1
50 TABLET ORAL DAILY
Qty: 90 TABLET | Refills: 1 | Status: SHIPPED | OUTPATIENT
Start: 2024-05-02

## 2024-05-02 RX ORDER — GABAPENTIN 100 MG/1
100 CAPSULE ORAL NIGHTLY
Qty: 30 CAPSULE | Refills: 3 | Status: SHIPPED | OUTPATIENT
Start: 2024-05-02 | End: 2024-06-01

## 2024-05-02 RX ORDER — LOSARTAN POTASSIUM 50 MG/1
50 TABLET ORAL DAILY
Qty: 30 TABLET | Refills: 0 | Status: SHIPPED | OUTPATIENT
Start: 2024-05-02 | End: 2024-05-02 | Stop reason: SDUPTHER

## 2024-05-02 RX ORDER — SILDENAFIL CITRATE 20 MG/1
20 TABLET ORAL DAILY PRN
Qty: 15 TABLET | Refills: 0 | Status: SHIPPED | OUTPATIENT
Start: 2024-05-02

## 2024-05-02 ASSESSMENT — PATIENT HEALTH QUESTIONNAIRE - PHQ9
SUM OF ALL RESPONSES TO PHQ9 QUESTIONS 1 & 2: 0
SUM OF ALL RESPONSES TO PHQ QUESTIONS 1-9: 0
SUM OF ALL RESPONSES TO PHQ QUESTIONS 1-9: 0
1. LITTLE INTEREST OR PLEASURE IN DOING THINGS: NOT AT ALL
SUM OF ALL RESPONSES TO PHQ QUESTIONS 1-9: 0
SUM OF ALL RESPONSES TO PHQ QUESTIONS 1-9: 0
2. FEELING DOWN, DEPRESSED OR HOPELESS: NOT AT ALL

## 2024-05-02 NOTE — PROGRESS NOTES
Visit Information    Have you changed or started any medications since your last visit including any over-the-counter medicines, vitamins, or herbal medicines? no   Are you having any side effects from any of your medications? -  no  Have you stopped taking any of your medications? Is so, why? -  no    Have you seen any other physician or provider since your last visit? No  Have you had any other diagnostic tests since your last visit? No  Have you been seen in the emergency room and/or had an admission to a hospital since we last saw you? No  Have you had your routine dental cleaning in the past 6 months? no    Have you activated your [employer] account? If not, what are your barriers? No:      Patient Care Team:  Eran Díaz MD as PCP - General (Internal Medicine)  Cristian Garcia MD as PCP - Empaneled Provider    Medical History Review  Past Medical, Family, and Social History reviewed and does contribute to the patient presenting condition    Health Maintenance   Topic Date Due    Pneumococcal 65+ years Vaccine (1 of 2 - PCV) Never done    DTaP/Tdap/Td vaccine (1 - Tdap) Never done    Shingles vaccine (1 of 2) Never done    Low dose CT lung screening &/or counseling  Never done    Respiratory Syncytial Virus (RSV) Pregnant or age 60 yrs+ (1 - 1-dose 60+ series) Never done    Colorectal Cancer Screen  04/04/2020    AAA screen  Never done    COVID-19 Vaccine (4 - 2023-24 season) 09/01/2023    Annual Wellness Visit (Medicare Advantage)  Never done    Flu vaccine (Season Ended) 08/01/2024    Depression Screen  09/18/2024    Lipids  05/10/2027    Hepatitis C screen  Completed    HIV screen  Completed    Hepatitis A vaccine  Aged Out    Hepatitis B vaccine  Aged Out    Hib vaccine  Aged Out    Polio vaccine  Aged Out    Meningococcal (ACWY) vaccine  Aged Out    Prostate Specific Antigen (PSA) Screening or Monitoring  Discontinued       
5/2/2025    Urinalysis     Standing Status:   Future     Standing Expiration Date:   5/2/2025     Order Specific Question:   SPECIFY(EX-CATH,MIDSTREAM,CYSTO,ETC)?     Answer:   Mid Stream    PSA Screening     Standing Status:   Future     Standing Expiration Date:   5/2/2025       Electronically signed by Eran Díaz MD on 5/2/2024 at 4:56 PM

## 2024-05-06 ENCOUNTER — TELEPHONE (OUTPATIENT)
Dept: INTERNAL MEDICINE CLINIC | Age: 64
End: 2024-05-06

## 2024-05-06 NOTE — TELEPHONE ENCOUNTER
Patient calling regarding Sildenafil. Informed him the PA was denied by insurance. Patient agreed to use [employer] coupon.

## 2024-05-08 ENCOUNTER — TELEPHONE (OUTPATIENT)
Dept: GASTROENTEROLOGY | Age: 64
End: 2024-05-08

## 2024-05-08 NOTE — TELEPHONE ENCOUNTER
Attempt 1, writer left pt voicemail message to call office back to schedule colonoscopy/Aziz    Attempt 2, writer sent pt letter/colon screening questionnaire in mail    Colonoscopy (WQ/Screening)  Dx: Colon cancer screening

## 2024-06-01 PROBLEM — Z12.5 PROSTATE CANCER SCREENING: Status: RESOLVED | Noted: 2024-05-02 | Resolved: 2024-06-01

## 2024-06-03 ENCOUNTER — HOSPITAL ENCOUNTER (OUTPATIENT)
Age: 64
Setting detail: SPECIMEN
Discharge: HOME | End: 2024-06-03

## 2024-06-03 DIAGNOSIS — E55.9 VITAMIN D DEFICIENCY: ICD-10-CM

## 2024-06-03 DIAGNOSIS — Z12.5 PROSTATE CANCER SCREENING: ICD-10-CM

## 2024-06-03 DIAGNOSIS — F10.20 ALCOHOLISM (HCC): ICD-10-CM

## 2024-06-03 DIAGNOSIS — G62.1 ALCOHOL-INDUCED POLYNEUROPATHY (HCC): ICD-10-CM

## 2024-06-03 DIAGNOSIS — I10 HYPERTENSION, ESSENTIAL: ICD-10-CM

## 2024-06-03 DIAGNOSIS — F18.10 ABUSE OF SMOKED SUBSTANCE (HCC): ICD-10-CM

## 2024-06-03 DIAGNOSIS — R73.9 HYPERGLYCEMIA: ICD-10-CM

## 2024-06-03 LAB
25(OH)D3 SERPL-MCNC: 34.8 NG/ML (ref 30–100)
ALBUMIN SERPL-MCNC: 4.3 G/DL (ref 3.5–5.2)
ALBUMIN/GLOB SERPL: 2 {RATIO} (ref 1–2.5)
ALP SERPL-CCNC: 46 U/L (ref 40–129)
ALT SERPL-CCNC: 16 U/L (ref 10–50)
ANION GAP SERPL CALCULATED.3IONS-SCNC: 12 MMOL/L (ref 9–16)
AST SERPL-CCNC: 25 U/L (ref 10–50)
BASOPHILS # BLD: 0.08 K/UL (ref 0–0.2)
BASOPHILS NFR BLD: 1 % (ref 0–2)
BILIRUB SERPL-MCNC: 0.4 MG/DL (ref 0–1.2)
BILIRUB UR QL STRIP: NEGATIVE
BUN SERPL-MCNC: 18 MG/DL (ref 8–23)
CALCIUM SERPL-MCNC: 9.3 MG/DL (ref 8.6–10.4)
CHLORIDE SERPL-SCNC: 104 MMOL/L (ref 98–107)
CHOLEST SERPL-MCNC: 202 MG/DL (ref 0–199)
CHOLESTEROL/HDL RATIO: 3
CLARITY UR: CLEAR
CO2 SERPL-SCNC: 22 MMOL/L (ref 20–31)
COLOR UR: YELLOW
COMMENT: NORMAL
CREAT SERPL-MCNC: 0.9 MG/DL (ref 0.7–1.2)
EOSINOPHIL # BLD: 0.23 K/UL (ref 0–0.44)
EOSINOPHILS RELATIVE PERCENT: 4 % (ref 1–4)
ERYTHROCYTE [DISTWIDTH] IN BLOOD BY AUTOMATED COUNT: 14.9 % (ref 11.8–14.4)
EST. AVERAGE GLUCOSE BLD GHB EST-MCNC: 108 MG/DL
GFR, ESTIMATED: >90 ML/MIN/1.73M2
GLUCOSE SERPL-MCNC: 72 MG/DL (ref 74–99)
GLUCOSE UR STRIP-MCNC: NEGATIVE MG/DL
HBA1C MFR BLD: 5.4 % (ref 4–6)
HCT VFR BLD AUTO: 37.9 % (ref 40.7–50.3)
HDLC SERPL-MCNC: 76 MG/DL
HGB BLD-MCNC: 12.9 G/DL (ref 13–17)
HGB UR QL STRIP.AUTO: NEGATIVE
IMM GRANULOCYTES # BLD AUTO: <0.03 K/UL (ref 0–0.3)
IMM GRANULOCYTES NFR BLD: 0 %
KETONES UR STRIP-MCNC: NEGATIVE MG/DL
LDLC SERPL CALC-MCNC: 112 MG/DL (ref 0–100)
LEUKOCYTE ESTERASE UR QL STRIP: NEGATIVE
LYMPHOCYTES NFR BLD: 1.57 K/UL (ref 1.1–3.7)
LYMPHOCYTES RELATIVE PERCENT: 26 % (ref 24–43)
MCH RBC QN AUTO: 32.6 PG (ref 25.2–33.5)
MCHC RBC AUTO-ENTMCNC: 34 G/DL (ref 28.4–34.8)
MCV RBC AUTO: 95.7 FL (ref 82.6–102.9)
MONOCYTES NFR BLD: 0.86 K/UL (ref 0.1–1.2)
MONOCYTES NFR BLD: 14 % (ref 3–12)
NEUTROPHILS NFR BLD: 55 % (ref 36–65)
NEUTS SEG NFR BLD: 3.28 K/UL (ref 1.5–8.1)
NITRITE UR QL STRIP: NEGATIVE
NRBC BLD-RTO: 0 PER 100 WBC
PH UR STRIP: 5.5 [PH] (ref 5–8)
PLATELET # BLD AUTO: 261 K/UL (ref 138–453)
PMV BLD AUTO: 10.3 FL (ref 8.1–13.5)
POTASSIUM SERPL-SCNC: 4.2 MMOL/L (ref 3.7–5.3)
PROT SERPL-MCNC: 7.1 G/DL (ref 6.6–8.7)
PROT UR STRIP-MCNC: NEGATIVE MG/DL
PSA SERPL-MCNC: 1.4 NG/ML (ref 0–4)
RBC # BLD AUTO: 3.96 M/UL (ref 4.21–5.77)
RBC # BLD: ABNORMAL 10*6/UL
SODIUM SERPL-SCNC: 138 MMOL/L (ref 136–145)
SP GR UR STRIP: 1.02 (ref 1–1.03)
TRIGL SERPL-MCNC: 69 MG/DL
TSH SERPL DL<=0.05 MIU/L-ACNC: 1.12 UIU/ML (ref 0.27–4.2)
UROBILINOGEN UR STRIP-ACNC: NORMAL EU/DL (ref 0–1)
VLDLC SERPL CALC-MCNC: 14 MG/DL
WBC OTHER # BLD: 6 K/UL (ref 3.5–11.3)

## 2024-09-05 ENCOUNTER — HOSPITAL ENCOUNTER (OUTPATIENT)
Age: 64
Setting detail: SPECIMEN
Discharge: HOME | End: 2024-09-05

## 2024-09-05 ENCOUNTER — OFFICE VISIT (OUTPATIENT)
Dept: INTERNAL MEDICINE CLINIC | Age: 64
End: 2024-09-05

## 2024-09-05 VITALS
SYSTOLIC BLOOD PRESSURE: 118 MMHG | HEIGHT: 70 IN | WEIGHT: 203 LBS | DIASTOLIC BLOOD PRESSURE: 60 MMHG | BODY MASS INDEX: 29.06 KG/M2

## 2024-09-05 DIAGNOSIS — Z00.00 INITIAL MEDICARE ANNUAL WELLNESS VISIT: Primary | ICD-10-CM

## 2024-09-05 DIAGNOSIS — Z87.891 PERSONAL HISTORY OF TOBACCO USE: ICD-10-CM

## 2024-09-05 DIAGNOSIS — D50.9 IRON DEFICIENCY ANEMIA, UNSPECIFIED IRON DEFICIENCY ANEMIA TYPE: ICD-10-CM

## 2024-09-05 LAB
IMM RETICS NFR: 5.9 % (ref 2.7–18.3)
IRON SATN MFR SERPL: 42 % (ref 20–55)
IRON SERPL-MCNC: 131 UG/DL (ref 61–157)
RETIC HEMOGLOBIN: 35.7 PG (ref 28.2–35.7)
RETICS # AUTO: 0.05 M/UL (ref 0.03–0.08)
RETICS/RBC NFR AUTO: 1.2 % (ref 0.5–1.9)
TIBC SERPL-MCNC: 314 UG/DL (ref 250–450)
UNSATURATED IRON BINDING CAPACITY: 183 UG/DL (ref 112–347)

## 2024-09-05 ASSESSMENT — LIFESTYLE VARIABLES
HAVE YOU OR SOMEONE ELSE BEEN INJURED AS A RESULT OF YOUR DRINKING: NO
HOW OFTEN DURING THE LAST YEAR HAVE YOU FOUND THAT YOU WERE NOT ABLE TO STOP DRINKING ONCE YOU HAD STARTED: NEVER
HOW MANY STANDARD DRINKS CONTAINING ALCOHOL DO YOU HAVE ON A TYPICAL DAY: 3 OR 4
HOW OFTEN DURING THE LAST YEAR HAVE YOU FAILED TO DO WHAT WAS NORMALLY EXPECTED FROM YOU BECAUSE OF DRINKING: NEVER
HAS A RELATIVE, FRIEND, DOCTOR, OR ANOTHER HEALTH PROFESSIONAL EXPRESSED CONCERN ABOUT YOUR DRINKING OR SUGGESTED YOU CUT DOWN: NO
HOW OFTEN DURING THE LAST YEAR HAVE YOU HAD A FEELING OF GUILT OR REMORSE AFTER DRINKING: NEVER
HOW OFTEN DO YOU HAVE A DRINK CONTAINING ALCOHOL: 4 OR MORE TIMES A WEEK
HOW OFTEN DURING THE LAST YEAR HAVE YOU NEEDED AN ALCOHOLIC DRINK FIRST THING IN THE MORNING TO GET YOURSELF GOING AFTER A NIGHT OF HEAVY DRINKING: NEVER
HOW OFTEN DURING THE LAST YEAR HAVE YOU BEEN UNABLE TO REMEMBER WHAT HAPPENED THE NIGHT BEFORE BECAUSE YOU HAD BEEN DRINKING: NEVER

## 2024-09-05 ASSESSMENT — PATIENT HEALTH QUESTIONNAIRE - PHQ9
SUM OF ALL RESPONSES TO PHQ QUESTIONS 1-9: 0
1. LITTLE INTEREST OR PLEASURE IN DOING THINGS: NOT AT ALL
SUM OF ALL RESPONSES TO PHQ QUESTIONS 1-9: 0
SUM OF ALL RESPONSES TO PHQ QUESTIONS 1-9: 0
2. FEELING DOWN, DEPRESSED OR HOPELESS: NOT AT ALL
SUM OF ALL RESPONSES TO PHQ9 QUESTIONS 1 & 2: 0
SUM OF ALL RESPONSES TO PHQ QUESTIONS 1-9: 0

## 2024-09-05 NOTE — PROGRESS NOTES
Visit Information    Have you changed or started any medications since your last visit including any over-the-counter medicines, vitamins, or herbal medicines? no   Are you having any side effects from any of your medications? -  no  Have you stopped taking any of your medications? Is so, why? -  no    Have you seen any other physician or provider since your last visit? No  Have you had any other diagnostic tests since your last visit? No  Have you been seen in the emergency room and/or had an admission to a hospital since we last saw you? No  Have you had your routine dental cleaning in the past 6 months? yes -     Have you activated your [employer] account? If not, what are your barriers? No:      Patient Care Team:  Eran Díaz MD as PCP - General (Internal Medicine)  Eran Díaz MD as PCP - Empaneled Provider    Medical History Review  Past Medical, Family, and Social History reviewed and does contribute to the patient presenting condition    Health Maintenance   Topic Date Due    Pneumococcal 65+ years Vaccine (1 of 2 - PCV) Never done    DTaP/Tdap/Td vaccine (1 - Tdap) Never done    Shingles vaccine (1 of 2) Never done    Lung Cancer Screening &/or Counseling  Never done    Respiratory Syncytial Virus (RSV) Pregnant or age 60 yrs+ (1 - 1-dose 60+ series) Never done    Colorectal Cancer Screen  04/04/2020    AAA screen  Never done    Annual Wellness Visit (Medicare Advantage)  Never done    Flu vaccine (1) Never done    COVID-19 Vaccine (4 - 2023-24 season) 09/01/2024    Depression Screen  05/02/2025    Lipids  06/03/2029    Hepatitis C screen  Completed    Hepatitis A vaccine  Aged Out    Hepatitis B vaccine  Aged Out    Hib vaccine  Aged Out    Polio vaccine  Aged Out    Meningococcal (ACWY) vaccine  Aged Out    Diabetes screen  Discontinued    HIV screen  Discontinued    Prostate Specific Antigen (PSA) Screening or Monitoring  Discontinued       
daily activities because of your eyesight?: No  Have you had an eye exam within the past year?: (!) No  Interventions:   Patient encouraged to make appointment with their eye specialist      Advanced Directives:  Do you have a Living Will?: (!) No    Intervention:  has NO advanced directive - information provided        Tobacco Use:    Tobacco Use      Smoking status: Every Day        Packs/day: 2.00        Years: 2.0 packs/day for 52.1 years (104.2 ttl pk-yrs)        Types: Cigarettes        Start date: 8/7/1972      Smokeless tobacco: Never      Tobacco comments: tried Chantix     Interventions:  Patient declined any further intervention or treatment                      Objective   Vitals:    09/05/24 1546   BP: 118/60   Site: Left Upper Arm   Weight: 92.1 kg (203 lb)   Height: 1.778 m (5' 10\")      Body mass index is 29.13 kg/m².        General Appearance: alert and oriented to person, place and time, well developed and well- nourished, in no acute distress  Skin: warm and dry, no rash or erythema  Head: normocephalic and atraumatic  Eyes: pupils equal, round, and reactive to light, extraocular eye movements intact, conjunctivae normal  ENT: tympanic membrane, external ear and ear canal normal bilaterally, nose without deformity, nasal mucosa and turbinates normal without polyps  Neck: supple and non-tender without mass, no thyromegaly or thyroid nodules, no cervical lymphadenopathy  Pulmonary/Chest: clear to auscultation bilaterally- no wheezes, rales or rhonchi, normal air movement, no respiratory distress  Cardiovascular: normal rate, regular rhythm, normal S1 and S2, no murmurs, rubs, clicks, or gallops, distal pulses intact, no carotid bruits  Abdomen: soft, non-tender, non-distended, normal bowel sounds, no masses or organomegaly  Extremities: no cyanosis, clubbing or edema  Musculoskeletal: normal range of motion, no joint swelling, deformity or tenderness  Neurologic: reflexes normal and symmetric, no

## 2024-09-05 NOTE — PATIENT INSTRUCTIONS
Instructions.\"  Current as of: June 24, 2023  Content Version: 14.1  © 4324-1208 [employer].   Care instructions adapted under license by [employer]. If you have questions about a medical condition or this instruction, always ask your healthcare professional. [employer] disclaims any warranty or liability for your use of this information.      Personalized Preventive Plan for Ovi Martins - 9/5/2024  Medicare offers a range of preventive health benefits. Some of the tests and screenings are paid in full while other may be subject to a deductible, co-insurance, and/or copay.    Some of these benefits include a comprehensive review of your medical history including lifestyle, illnesses that may run in your family, and various assessments and screenings as appropriate.    After reviewing your medical record and screening and assessments performed today your provider may have ordered immunizations, labs, imaging, and/or referrals for you.  A list of these orders (if applicable) as well as your Preventive Care list are included within your After Visit Summary for your review.    Other Preventive Recommendations:    A preventive eye exam performed by an eye specialist is recommended every 1-2 years to screen for glaucoma; cataracts, macular degeneration, and other eye disorders.  A preventive dental visit is recommended every 6 months.  Try to get at least 150 minutes of exercise per week or 10,000 steps per day on a pedometer .  Order or download the FREE \"Exercise & Physical Activity: Your Everyday Guide\" from [employer] [place] [employer]. Call 1-854.916.8960 or search [employer] [place] [employer] online.  You need 2384-1860 mg of calcium and 2952-3949 IU of vitamin D per day. It is possible to meet your calcium requirement with diet alone, but a vitamin D supplement is usually necessary to meet this goal.  When exposed to the sun, use a sunscreen that protects against both UVA

## 2024-09-06 LAB
FOLATE SERPL-MCNC: 9.4 NG/ML (ref 4.8–24.2)
VIT B12 SERPL-MCNC: 720 PG/ML (ref 232–1245)

## 2024-09-18 ENCOUNTER — HOSPITAL ENCOUNTER (OUTPATIENT)
Dept: CT IMAGING | Age: 64
Discharge: HOME | End: 2024-09-20
Attending: INTERNAL MEDICINE
Payer: COMMERCIAL

## 2024-09-18 VITALS — WEIGHT: 203 LBS | BODY MASS INDEX: 29.06 KG/M2 | HEIGHT: 70 IN

## 2024-09-18 DIAGNOSIS — Z87.891 PERSONAL HISTORY OF TOBACCO USE: ICD-10-CM

## 2024-09-18 PROCEDURE — 71271 CT THORAX LUNG CANCER SCR C-: CPT

## 2024-09-20 ENCOUNTER — TELEPHONE (OUTPATIENT)
Dept: PULMONOLOGY | Age: 64
End: 2024-09-20

## 2024-09-25 ENCOUNTER — HOSPITAL ENCOUNTER (OUTPATIENT)
Dept: PREADMISSION TESTING | Age: 64
Discharge: HOME | End: 2024-09-29

## 2024-09-25 VITALS — HEIGHT: 70 IN | BODY MASS INDEX: 29.06 KG/M2 | WEIGHT: 203 LBS

## 2024-09-25 NOTE — PROGRESS NOTES
Pre-op Instructions For Out-Patient Endoscopy Surgery    Medication Instructions:  Please stop herbs and any supplements now (includes vitamins and minerals).    Please contact your surgeon and prescribing physician for pre-op instructions for any blood thinners.    If you have inhalers/aerosol treatments at home, please use them the morning of your surgery and bring the inhalers with you to the hospital.    Please take the following medications the morning of your surgery with a sip of water:    None    Surgery Instructions:  After midnight before surgery:  Do not eat or drink anything, including water, mints, gum, and hard candy.  You may brush your teeth without swallowing.  No smoking, chewing tobacco, or street drugs.    Please shower or bathe before surgery.       Please do not wear any cologne, lotion, powder, jewelry, piercings, perfume, makeup, nail polish, hair accessories, or hair spray on the day of surgery.  Wear loose comfortable clothing.    Leave your valuables at home but bring a payment source for any after-surgery prescriptions you plan to fill at [facility].  Bring a storage case for any glasses/contacts.    An adult who is responsible for you MUST drive you home and should be with you for the first 24 hours after surgery.     The Day of Surgery:  Arrive at [facility] Surgery Entrance at the time directed by your surgeon and check in at the desk.     If you have a living will or healthcare power of , please bring a copy.    You will be taken to the pre-op holding area where you will be prepared for surgery.  A physical assessment will be performed by a nurse practitioner or house officer.  Your IV will be started and you will meet your anesthesiologist.    When you go to surgery, your family will be directed to the surgical waiting room, where the doctor should speak with them after your surgery.    After surgery, you will be taken to the recovery area.  When you

## 2024-09-27 ENCOUNTER — OFFICE VISIT (OUTPATIENT)
Dept: PULMONOLOGY | Age: 64
End: 2024-09-27
Payer: COMMERCIAL

## 2024-09-27 VITALS
BODY MASS INDEX: 28.83 KG/M2 | TEMPERATURE: 97 F | RESPIRATION RATE: 18 BRPM | HEIGHT: 70 IN | WEIGHT: 201.4 LBS | DIASTOLIC BLOOD PRESSURE: 89 MMHG | HEART RATE: 65 BPM | SYSTOLIC BLOOD PRESSURE: 160 MMHG

## 2024-09-27 DIAGNOSIS — Z72.0 TOBACCO ABUSE: ICD-10-CM

## 2024-09-27 DIAGNOSIS — Z23 ENCOUNTER FOR IMMUNIZATION: ICD-10-CM

## 2024-09-27 DIAGNOSIS — R91.8 MASS OF RIGHT LUNG: Primary | ICD-10-CM

## 2024-09-27 DIAGNOSIS — J43.9 PULMONARY EMPHYSEMA, UNSPECIFIED EMPHYSEMA TYPE (HCC): ICD-10-CM

## 2024-09-27 PROCEDURE — 3077F SYST BP >= 140 MM HG: CPT | Performed by: INTERNAL MEDICINE

## 2024-09-27 PROCEDURE — 90677 PCV20 VACCINE IM: CPT | Performed by: INTERNAL MEDICINE

## 2024-09-27 PROCEDURE — 3079F DIAST BP 80-89 MM HG: CPT | Performed by: INTERNAL MEDICINE

## 2024-09-27 PROCEDURE — 90653 IIV ADJUVANT VACCINE IM: CPT | Performed by: INTERNAL MEDICINE

## 2024-09-27 PROCEDURE — G0009 ADMIN PNEUMOCOCCAL VACCINE: HCPCS | Performed by: INTERNAL MEDICINE

## 2024-09-27 PROCEDURE — G0008 ADMIN INFLUENZA VIRUS VAC: HCPCS | Performed by: INTERNAL MEDICINE

## 2024-09-27 PROCEDURE — 1123F ACP DISCUSS/DSCN MKR DOCD: CPT | Performed by: INTERNAL MEDICINE

## 2024-09-27 PROCEDURE — 99204 OFFICE O/P NEW MOD 45 MIN: CPT | Performed by: INTERNAL MEDICINE

## 2024-09-27 RX ORDER — BUPROPION HYDROCHLORIDE 150 MG/1
150 TABLET, FILM COATED, EXTENDED RELEASE ORAL 2 TIMES DAILY
Qty: 180 TABLET | Refills: 0 | Status: SHIPPED | OUTPATIENT
Start: 2024-09-27 | End: 2024-10-18 | Stop reason: ALTCHOICE

## 2024-09-27 NOTE — PROGRESS NOTES
PULMONARY MEDICINE OUTPATIENT NOTE                                                                       PATIENT:  Ovi Martins  YOB: 1960  MRN: [id number]     REFERRED BY: Eran Díaz MD   CHIEF COMPLIANT: Abnormal CT Scan (NEW PATIENT )       HISTORY     Ovi Martins is a 64 y.o. years old male is here for an initial evaluation in the pulmonary clinic    DATE OF VISIT:9/27/2024    PULMONARY PROBLEM LIST:  1. Mass of right lung    2. Pulmonary emphysema, unspecified emphysema type (HCC)    3. Tobacco abuse    4. Encounter for immunization         HISTORY OF PRESENT ILLNESS/CURRENT SYMPTOMS:   Patient is a current smoker of 2 packs/day. He has attempted to quit smoking with nicotine gum and Chantix without improvement. He is seeing Dr. Díaz. No previous diagnosis of COPD or asthma. He does notice increased shortness of breath since 2019 beginning after a respiratory illness. He currently has chronic cough and phlegm. No hemoptysis. He plans to have a colonoscopy done. No family history of lung cancer.     PFT unavailable  Chest CT (9/18/2024) reported a masslike consolidation in the right lung, which was thought to represent pneumonia versus rounded atelectasis versus malignancy    CURRENT BRONCHODILATORS/OTHER PULMONARY MEDS:  None    TOBACCO HISTORY:  He  reports that he has been smoking cigarettes. He started smoking about 52 years ago. He has a 104.3 pack-year smoking history. He has never used smokeless tobacco.    AVOCATION/OCCUPATIONAL EXPOSURE:     Yes No   ASBESTOS [x] []   SILICA DUST [] [x]   COAL [] [x]   FOUNDRY [] [x]   QUARRY [] [x]   COTTON MILL [] [x]   PETS [] [x]   PARAKEETS  []  [x]   TUBERCULOSIS [] [x]   HOT TUB [] [x]   DRUGS [] [x]   OTHER (spray paint) [x] []     PULMONARY CO-MORBIDITIES/RISK FACTORS:     Yes No   NASO-BRONCHIAL/ENVIRONMENTAL ALLERGIES [] [x]   BRONCHIAL ASTHMA [] [x]   CHRONIC BRONCHITIS/EMPHYSEMA/COPD [x] []   CHRONIC SINUSITIS/POSTNASAL DRIP

## 2024-10-10 ENCOUNTER — HOSPITAL ENCOUNTER (OUTPATIENT)
Dept: PULMONOLOGY | Age: 64
Discharge: HOME | End: 2024-10-10
Payer: COMMERCIAL

## 2024-10-10 ENCOUNTER — HOSPITAL ENCOUNTER (OUTPATIENT)
Dept: NUCLEAR MEDICINE | Age: 64
Discharge: HOME | End: 2024-10-12
Attending: INTERNAL MEDICINE
Payer: COMMERCIAL

## 2024-10-10 DIAGNOSIS — R91.8 MASS OF RIGHT LUNG: ICD-10-CM

## 2024-10-10 LAB — GLUCOSE BLD-MCNC: 88 MG/DL (ref 75–110)

## 2024-10-10 PROCEDURE — 94060 EVALUATION OF WHEEZING: CPT

## 2024-10-10 PROCEDURE — 94640 AIRWAY INHALATION TREATMENT: CPT

## 2024-10-10 PROCEDURE — A9609 HC RX DIAGNOSTIC RADIOPHARMACEUTICAL: HCPCS | Performed by: INTERNAL MEDICINE

## 2024-10-10 PROCEDURE — 94729 DIFFUSING CAPACITY: CPT

## 2024-10-10 PROCEDURE — 94010 BREATHING CAPACITY TEST: CPT

## 2024-10-10 PROCEDURE — 94726 PLETHYSMOGRAPHY LUNG VOLUMES: CPT

## 2024-10-10 PROCEDURE — 82947 ASSAY GLUCOSE BLOOD QUANT: CPT

## 2024-10-10 PROCEDURE — 3430000000 HC RX DIAGNOSTIC RADIOPHARMACEUTICAL: Performed by: INTERNAL MEDICINE

## 2024-10-10 PROCEDURE — 2580000003 HC RX 258: Performed by: INTERNAL MEDICINE

## 2024-10-10 PROCEDURE — 94664 DEMO&/EVAL PT USE INHALER: CPT

## 2024-10-10 PROCEDURE — 78815 PET IMAGE W/CT SKULL-THIGH: CPT

## 2024-10-10 RX ORDER — FLUDEOXYGLUCOSE F 18 200 MCI/ML
10 INJECTION, SOLUTION INTRAVENOUS
Status: COMPLETED | OUTPATIENT
Start: 2024-10-10 | End: 2024-10-10

## 2024-10-10 RX ORDER — SODIUM CHLORIDE 0.9 % (FLUSH) 0.9 %
10 SYRINGE (ML) INJECTION PRN
Status: DISCONTINUED | OUTPATIENT
Start: 2024-10-10 | End: 2024-10-13 | Stop reason: HOSPADM

## 2024-10-10 RX ADMIN — SODIUM CHLORIDE, PRESERVATIVE FREE 10 ML: 5 INJECTION INTRAVENOUS at 13:02

## 2024-10-10 RX ADMIN — FLUDEOXYGLUCOSE F 18 12.03 MILLICURIE: 200 INJECTION, SOLUTION INTRAVENOUS at 13:00

## 2024-10-11 NOTE — PROCEDURES
[id number] [place] [address]                           PULMONARY FUNCTION      PATIENT NAME: ARA MITTAL            : 1960  MED REC NO: [id number]                         ROOM:   ACCOUNT NO: [id number]                       ADMIT DATE: 10/10/2024  PROVIDER: Nadege Johnson MD      DATE OF PROCEDURE: 10/10/2024    SURGEON:  Nadege Johnson MD    Spirometry shows FVC is 3.53, 84% predicted.  FEV1 is 1.74, 54% predicted, consistent with moderate obstructive ventilatory impairment. FEV1/FVC ratio and flow volume loop are consistent with obstructive ventilatory impairment.  Post-bronchodilator, there is no significant response to bronchodilator to suggest bronchospasticity.    Lung volume shows residual volume is 2.60, 109% predicted; total lung capacity is 6.68, 95% predicted, both are within normal limits.    Diffusion capacity is 18.31, 69% predicted, consistent with mild reduction in diffusion capacity which could be secondary to emphysema, intraparenchymal lung disease, pulmonary vascular disease or anemia.    IMPRESSION:  This pulmonary function test is consistent with moderate obstructive ventilatory impairment, stage II obstruction.  There was no significant response to bronchodilator but clinical response is possible.  Diffusion capacity is mildly reduced which is likely secondary to emphysema.  Clinical correlation is recommended.          NADEGE JOHNSON MD      D:  10/10/2024 17:48:06     T:  10/11/2024 02:15:48     MARIA INES/SABA  Job #:  [id number]     Doc#:  [id number]

## 2024-10-18 ENCOUNTER — OFFICE VISIT (OUTPATIENT)
Dept: PULMONOLOGY | Age: 64
End: 2024-10-18
Payer: COMMERCIAL

## 2024-10-18 VITALS
BODY MASS INDEX: 29.49 KG/M2 | RESPIRATION RATE: 18 BRPM | DIASTOLIC BLOOD PRESSURE: 87 MMHG | SYSTOLIC BLOOD PRESSURE: 167 MMHG | WEIGHT: 206 LBS | OXYGEN SATURATION: 96 % | HEIGHT: 70 IN | HEART RATE: 68 BPM

## 2024-10-18 DIAGNOSIS — J44.9 STAGE 2 MODERATE COPD BY GOLD CLASSIFICATION (HCC): Primary | ICD-10-CM

## 2024-10-18 DIAGNOSIS — R91.8 MASS OF RIGHT LUNG: ICD-10-CM

## 2024-10-18 DIAGNOSIS — Q38.3 TONGUE ABNORMALITY: ICD-10-CM

## 2024-10-18 DIAGNOSIS — Z72.0 TOBACCO ABUSE: ICD-10-CM

## 2024-10-18 PROCEDURE — 1123F ACP DISCUSS/DSCN MKR DOCD: CPT | Performed by: INTERNAL MEDICINE

## 2024-10-18 PROCEDURE — 99214 OFFICE O/P EST MOD 30 MIN: CPT | Performed by: INTERNAL MEDICINE

## 2024-10-18 PROCEDURE — 3079F DIAST BP 80-89 MM HG: CPT | Performed by: INTERNAL MEDICINE

## 2024-10-18 PROCEDURE — 3077F SYST BP >= 140 MM HG: CPT | Performed by: INTERNAL MEDICINE

## 2024-10-18 RX ORDER — VARENICLINE TARTRATE 0.5 (11)-1
KIT ORAL
Qty: 1 EACH | Refills: 0 | Status: SHIPPED | OUTPATIENT
Start: 2024-10-18 | End: 2025-01-10

## 2024-10-18 NOTE — PROGRESS NOTES
changes of  centrilobular emphysema.    ABDOMEN: No abnormal metabolic activity is seen in the lymph nodes of the  retroperitoneum,  mesentery, mayank hepatis, gastrohepatic ligament.    No abnormal activity is noted in the liver.    No abnormal metabolic activity is seen in the  remainder of the  intra-abdominal solid organs.    CT portion of the study of the abdomen demonstrates no abnormality of the  intra-abdominal solid organs. No abnormality of the stomach the remainder of  the enteric tract.    Note is made of a 1.9 cm diameter right adrenal cortical adenoma with a  Hounsfield attenuation of 1 unit.  Another lesion attached to the adrenal  gland is seen more caudally on the right side and measures 2.4 cm with a  Hounsfield attenuation of -4 units also representing right adrenal cortical  adenoma. Note is made of a cystic lesion in the left adrenal gland and  measures 3.4 cm by 2.6 cm with a Hounsfield attenuation of -2 units  consistent with water density. The findings likely represent enlarged left  adrenal cortical adenoma.    PELVIS: No iliac chain lymphadenopathy is seen. No other lymph nodes are seen  in the pelvis. No abnormal metabolic activity is seen in the pelvic soft  tissues.    BONES/SOFT TISSUE: No abnormal metabolic activity is seen in the subcutaneous  soft tissues.    No abnormal metabolic activity is seen in the skeletal system.    The CT portion of the study in bone window settings demonstrates no  osteolytic or osteoblastic lesions.    INCIDENTAL CT FINDINGS: Bosniak type 1 cyst measuring 1.3 cm is seen in the  anterior aspect of the upper pole of the right kidney.  No further workup of  this cyst is suggested.    Impression  Metabolically active lesion which is asymmetric in the anterior tongue  extending towards the right side.  Further evaluation with direct  visualization is suggested to exclude cholecystitis versus neoplasm,  clinically indicated.    Otherwise no significantly

## 2024-10-21 ENCOUNTER — TELEPHONE (OUTPATIENT)
Dept: PULMONOLOGY | Age: 64
End: 2024-10-21

## 2024-10-21 NOTE — TELEPHONE ENCOUNTER
Outcome  Approved today by [employer] NCPDP 2017  Your request has been approved  Authorization Expiration Date: 4/19/2025

## 2024-12-03 DIAGNOSIS — F10.20 ALCOHOLISM (HCC): ICD-10-CM

## 2024-12-03 DIAGNOSIS — G62.1 ALCOHOL-INDUCED POLYNEUROPATHY (HCC): ICD-10-CM

## 2024-12-03 DIAGNOSIS — I10 HYPERTENSION, ESSENTIAL: ICD-10-CM

## 2024-12-03 DIAGNOSIS — F18.10 ABUSE OF SMOKED SUBSTANCE (HCC): ICD-10-CM

## 2024-12-03 RX ORDER — LOSARTAN POTASSIUM 50 MG/1
50 TABLET ORAL DAILY
Qty: 90 TABLET | Refills: 0 | Status: SHIPPED | OUTPATIENT
Start: 2024-12-03

## 2024-12-09 SDOH — ECONOMIC STABILITY: INCOME INSECURITY: HOW HARD IS IT FOR YOU TO PAY FOR THE VERY BASICS LIKE FOOD, HOUSING, MEDICAL CARE, AND HEATING?: NOT VERY HARD

## 2024-12-09 SDOH — ECONOMIC STABILITY: FOOD INSECURITY: WITHIN THE PAST 12 MONTHS, YOU WORRIED THAT YOUR FOOD WOULD RUN OUT BEFORE YOU GOT MONEY TO BUY MORE.: NEVER TRUE

## 2024-12-09 SDOH — ECONOMIC STABILITY: FOOD INSECURITY: WITHIN THE PAST 12 MONTHS, THE FOOD YOU BOUGHT JUST DIDN'T LAST AND YOU DIDN'T HAVE MONEY TO GET MORE.: NEVER TRUE

## 2024-12-11 ENCOUNTER — OFFICE VISIT (OUTPATIENT)
Dept: INTERNAL MEDICINE CLINIC | Age: 64
End: 2024-12-11
Payer: COMMERCIAL

## 2024-12-11 VITALS
HEART RATE: 74 BPM | WEIGHT: 207 LBS | SYSTOLIC BLOOD PRESSURE: 138 MMHG | DIASTOLIC BLOOD PRESSURE: 80 MMHG | OXYGEN SATURATION: 96 % | BODY MASS INDEX: 29.7 KG/M2

## 2024-12-11 DIAGNOSIS — F18.10 ABUSE OF SMOKED SUBSTANCE (HCC): ICD-10-CM

## 2024-12-11 DIAGNOSIS — G62.1 ALCOHOL-INDUCED POLYNEUROPATHY (HCC): ICD-10-CM

## 2024-12-11 DIAGNOSIS — D50.9 IRON DEFICIENCY ANEMIA, UNSPECIFIED IRON DEFICIENCY ANEMIA TYPE: ICD-10-CM

## 2024-12-11 DIAGNOSIS — E55.9 VITAMIN D DEFICIENCY: ICD-10-CM

## 2024-12-11 DIAGNOSIS — N52.9 ERECTILE DYSFUNCTION, UNSPECIFIED ERECTILE DYSFUNCTION TYPE: ICD-10-CM

## 2024-12-11 DIAGNOSIS — I10 HYPERTENSION, ESSENTIAL: Primary | ICD-10-CM

## 2024-12-11 DIAGNOSIS — F10.20 ALCOHOLISM (HCC): ICD-10-CM

## 2024-12-11 DIAGNOSIS — R56.9 SEIZURE (HCC): ICD-10-CM

## 2024-12-11 PROCEDURE — 1123F ACP DISCUSS/DSCN MKR DOCD: CPT | Performed by: INTERNAL MEDICINE

## 2024-12-11 PROCEDURE — 1159F MED LIST DOCD IN RCRD: CPT | Performed by: INTERNAL MEDICINE

## 2024-12-11 PROCEDURE — 3079F DIAST BP 80-89 MM HG: CPT | Performed by: INTERNAL MEDICINE

## 2024-12-11 PROCEDURE — 3075F SYST BP GE 130 - 139MM HG: CPT | Performed by: INTERNAL MEDICINE

## 2024-12-11 PROCEDURE — 99214 OFFICE O/P EST MOD 30 MIN: CPT | Performed by: INTERNAL MEDICINE

## 2024-12-11 NOTE — PROGRESS NOTES
Visit Information    Have you changed or started any medications since your last visit including any over-the-counter medicines, vitamins, or herbal medicines? no   Are you having any side effects from any of your medications? -  no  Have you stopped taking any of your medications? Is so, why? -  no    Have you seen any other physician or provider since your last visit? Yes - Records Obtained  Have you had any other diagnostic tests since your last visit? Yes - Records Obtained  Have you been seen in the emergency room and/or had an admission to a hospital since we last saw you? No  Have you had your routine dental cleaning in the past 6 months? no    Have you activated your [employer] account? If not, what are your barriers? Yes     Patient Care Team:  Eran Díaz MD as PCP - General (Internal Medicine)  Eran Díaz MD as PCP - Empaneled Provider  Tong Graff MD as Consulting Physician (Pulmonary Disease)    Medical History Review  Past Medical, Family, and Social History reviewed and does contribute to the patient presenting condition    Health Maintenance   Topic Date Due    DTaP/Tdap/Td vaccine (1 - Tdap) Never done    Shingles vaccine (1 of 2) Never done    Respiratory Syncytial Virus (RSV) Pregnant or age 60 yrs+ (1 - Risk 60-74 years 1-dose series) Never done    Colorectal Cancer Screen  04/04/2020    AAA screen  Never done    COVID-19 Vaccine (4 - 2023-24 season) 09/01/2024    Depression Screen  09/05/2025    Lung Cancer Screening &/or Counseling  10/10/2025    Lipids  06/03/2029    Annual Wellness Visit (Medicare Advantage)  Completed    Flu vaccine  Completed    Pneumococcal 65+ years Vaccine  Completed    Hepatitis C screen  Completed    Hepatitis A vaccine  Aged Out    Hepatitis B vaccine  Aged Out    Hib vaccine  Aged Out    Polio vaccine  Aged Out    Meningococcal (ACWY) vaccine  Aged Out    Pneumococcal 0-64 years Vaccine  Discontinued    Diabetes screen  Discontinued    HIV screen  
0.5 mg 2 times daily for 4 days, THEN 1 mg 2 times daily., Disp: 1 each, Rfl: 0    polyethylene glycol-electrolytes (NULYTELY) 420 g solution, Take as directed for bowel prep., Disp: 1 each, Rfl: 0    sildenafil (REVATIO) 20 MG tablet, Take 1 tablet by mouth daily as needed (erectile dysfunction), Disp: 15 tablet, Rfl: 0    gabapentin (NEURONTIN) 100 MG capsule, Take 1 capsule by mouth at bedtime for 30 days., Disp: 30 capsule, Rfl: 3    Allergies:      Chantix [varenicline]    Social History:    Tobacco:    reports that he has been smoking cigarettes. He started smoking about 52 years ago. He has a 104.7 pack-year smoking history. He has never used smokeless tobacco.  Alcohol:      reports current alcohol use.  Drug Use:  reports no history of drug use.    Family History:    Family History   Problem Relation Age of Onset    Other Mother     Colon Cancer Father        Review of Systems:    Positive and Negative as described in HPI    Constitutional:  negative for  fevers, chills, sweats, fatigue, and weight loss  HEENT:  negative for vision or hearing changes,   Respiratory:  negative for shortness of breath, cough, or congestion  Cardiovascular:  negative for  chest pain, palpitations  Gastrointestinal:  negative for nausea, vomiting, diarrhea, constipation, abdominal pain  Genitourinary:  negative for frequency, dysuria  Integument/Breast:  negative for rash, skin lesions  Musculoskeletal:  negative for muscle aches or joint pain  Neurological:  negative for headaches, dizziness, lightheadedness, numbness, pain and tingling extrimities  Behavior/Psych:  negative for depression and anxiety      Physical Exam:    Vitals:  /80   Pulse 74   Wt 93.9 kg (207 lb)   SpO2 96%   BMI 29.70 kg/m²     General appearance - alert, well appearing, and in no acute distress  Mental status - oriented to person, place, and time with normal affect  Head - normocephalic and atraumatic  Eyes - pupils equal and reactive,

## 2024-12-13 ENCOUNTER — TELEPHONE (OUTPATIENT)
Dept: GASTROENTEROLOGY | Age: 64
End: 2024-12-13

## 2025-01-02 ENCOUNTER — OFFICE VISIT (OUTPATIENT)
Dept: GASTROENTEROLOGY | Age: 65
End: 2025-01-02
Payer: COMMERCIAL

## 2025-01-02 ENCOUNTER — PREP FOR PROCEDURE (OUTPATIENT)
Dept: GASTROENTEROLOGY | Age: 65
End: 2025-01-02

## 2025-01-02 ENCOUNTER — TELEPHONE (OUTPATIENT)
Dept: GASTROENTEROLOGY | Age: 65
End: 2025-01-02

## 2025-01-02 VITALS
RESPIRATION RATE: 20 BRPM | TEMPERATURE: 97.7 F | HEIGHT: 70 IN | DIASTOLIC BLOOD PRESSURE: 74 MMHG | HEART RATE: 97 BPM | BODY MASS INDEX: 29.78 KG/M2 | SYSTOLIC BLOOD PRESSURE: 132 MMHG | WEIGHT: 208 LBS

## 2025-01-02 DIAGNOSIS — R10.13 DYSPEPSIA: ICD-10-CM

## 2025-01-02 DIAGNOSIS — D64.9 ANEMIA, UNSPECIFIED TYPE: Primary | ICD-10-CM

## 2025-01-02 DIAGNOSIS — Z80.0 FAMILY HISTORY OF COLON CANCER: ICD-10-CM

## 2025-01-02 PROCEDURE — 1126F AMNT PAIN NOTED NONE PRSNT: CPT | Performed by: INTERNAL MEDICINE

## 2025-01-02 PROCEDURE — 1123F ACP DISCUSS/DSCN MKR DOCD: CPT | Performed by: INTERNAL MEDICINE

## 2025-01-02 PROCEDURE — 1159F MED LIST DOCD IN RCRD: CPT | Performed by: INTERNAL MEDICINE

## 2025-01-02 PROCEDURE — 3075F SYST BP GE 130 - 139MM HG: CPT | Performed by: INTERNAL MEDICINE

## 2025-01-02 PROCEDURE — 99204 OFFICE O/P NEW MOD 45 MIN: CPT | Performed by: INTERNAL MEDICINE

## 2025-01-02 PROCEDURE — 3078F DIAST BP <80 MM HG: CPT | Performed by: INTERNAL MEDICINE

## 2025-01-02 ASSESSMENT — ENCOUNTER SYMPTOMS
BLOOD IN STOOL: 0
NAUSEA: 0
TROUBLE SWALLOWING: 0
CONSTIPATION: 1
ANAL BLEEDING: 0
SORE THROAT: 0
CHOKING: 0
RECTAL PAIN: 0
ABDOMINAL DISTENTION: 0
WHEEZING: 0
VOICE CHANGE: 0
VOMITING: 0
DIARRHEA: 0
COUGH: 1
ABDOMINAL PAIN: 1
COLOR CHANGE: 0

## 2025-01-02 NOTE — TELEPHONE ENCOUNTER
Pt saw Dr. Garcia today in clinic. EGD/Colonoscopy ordered. Pt states he does not take blood thinners or GLP-1 medications. Pt prefers an afternoon procedure. Pt said he already has bowel prep at home from when procedure was previously scheduled.    Procedure scheduled/Dr Garcia  Procedure: EGD/Colonoscopy (Screening)  Dx: Dyspepsia; Anemia, unspecified type; Family hx of colon cancer  Date: Tuesday 01/14/25  Time: 2:15 pm/Arrive at 12:15 pm  Hospital: [facility]; Surgery Entrance, back of hospital  PAT Phone Call: Wednesday 01/08/25 at 12:30 pm  Bowel Prep instructions given: EGD Prep/Nulytely Split-Bowel Prep  In office/via phone: in office  Clearance needed: N/A    Pt informed they will receive a PAT Phone Call from a [facility] PAT Nurse 1-2 weeks prior to procedure. Pt informed they must complete PAT Call or procedure may be cancelled.     Pt informed it is required they must have a /responsible adult that takes them to their procedure, stays at the hospital (before, during, and after procedure), and drives pt home. Pt informed /responsible adult must stay inside the hospital during their procedure. Pt voiced understanding of  protocol for procedure.

## 2025-01-02 NOTE — PROGRESS NOTES
Reason for Referral:       No referring provider defined for this encounter.    Chief Complaint   Patient presents with    New Patient    Anemia           HISTORY OF PRESENT ILLNESS: Mr.Jeffrey ALBERTO Martins is a 64 y.o. male with a past history remarkable for eczema, hypertension, referred for evaluation of anemia.  The patient reports that he has occasional dyspepsia particularly when he eats late at night.  He denies any rectal bleeding, altered bowel habits, nausea/vomiting and dysphagia.  He never had EGD before.  He had a direct laryngoscopy with ENT 1 to 2 years ago for abnormal PET/CT but was unremarkable except for possible reflux changes.  He had a colonoscopy 10 to 15 years ago that was unremarkable.  He does have significant family members with colon cancer including both mother and father.      Previous Endoscopies    No prior EGD.  Colonoscopy 10 to 15 years ago.    Previous GI workup       Past Medical,Family, and Social History reviewed and does not contribute to the patient presentingcondition.    Patient's PMH/PSH,SH,PSYCH Hx, MEDs, ALLERGIES, and ROS were all reviewed and updated in the appropriate sections.    PAST MEDICAL HISTORY:  Past Medical History:   Diagnosis Date    Eczema     History of cervical fracture     Primary hypertension        Past Surgical History:   Procedure Laterality Date    TONSILLECTOMY         CURRENT MEDICATIONS:    Current Outpatient Medications:     losartan (COZAAR) 50 MG tablet, Take 1 tablet by mouth once daily, Disp: 90 tablet, Rfl: 0    Varenicline Tartrate, Starter, 0.5 MG X 11 & 1 MG X 42 TBPK, Take 0.5 mg by mouth daily for 3 days, THEN 0.5 mg 2 times daily for 4 days, THEN 1 mg 2 times daily., Disp: 1 each, Rfl: 0    polyethylene glycol-electrolytes (NULYTELY) 420 g solution, Take as directed for bowel prep., Disp: 1 each, Rfl: 0    sildenafil (REVATIO) 20 MG tablet, Take 1 tablet by mouth daily as needed (erectile dysfunction), Disp: 15 tablet, Rfl: 0

## 2025-01-08 ENCOUNTER — HOSPITAL ENCOUNTER (OUTPATIENT)
Dept: PREADMISSION TESTING | Age: 65
Discharge: HOME | End: 2025-01-12

## 2025-01-08 VITALS — BODY MASS INDEX: 29.78 KG/M2 | HEIGHT: 70 IN | WEIGHT: 208 LBS

## 2025-01-08 NOTE — PROGRESS NOTES
Pre-op Instructions For Out-Patient Endoscopy Surgery    Medication Instructions:  Please stop herbs and any supplements now (includes vitamins and minerals).    For these medications:  Dulaglutide (Trulicity), Exenatide (Byetta and Bydureon, Liraglutide (Victoza), Lixisenatide (Adlyxin), Semaglutide (Ozempic and Rybelsus), Tirzepatide (Mounjaro, Zepbound)- Stop 1 week prior if taking weekly or 1 day prior if taking every 12 hours or daily.     Please contact your surgeon and prescribing physician for pre-op instructions for any blood thinners. Stop taking as directed    If you have inhalers/aerosol treatments at home, please use them the morning of your surgery and bring the inhalers with you to the hospital.    Please take the following medications the morning of your surgery with a sip of water:    None    Surgery Instructions:  After midnight before surgery:  Do not eat or drink anything, including water, mints, gum, and hard candy.  You may brush your teeth without swallowing.  No smoking, chewing tobacco, or street drugs.     ** Please Follow Bowel Prep instructions if given by surgeon's office**    Please shower or bathe before surgery.       Please do not wear any cologne, lotion, powder, jewelry, piercings, perfume, makeup, nail polish, hair accessories, or hair spray on the day of surgery.  Wear loose comfortable clothing.    Leave your valuables at home but bring a payment source for any after-surgery prescriptions you plan to fill at [facility].  Bring a storage case for any glasses/contacts.    An adult who is responsible for you MUST drive you home and should be with you for the first 24 hours after surgery.     The Day of Surgery:  Arrive at [facility] Entr[facility] at the time directed by your surgeon and check in at the desk.     If you have a living will or healthcare power of , please bring a copy.    You will be taken to the pre-op holding area where you will

## 2025-01-10 NOTE — PRE-PROCEDURE INSTRUCTIONS
No answer, left message ?                             Unable to leave message ?    When were you told to arrive at hospital ?  3241    Do you have a  ?yes    Are you on any blood thinners ?    no                 If yes when did you stop taking ?    Do you have your prep Rx filled and instruction ? yes     Nothing to eat the day before , only clear liquids.yes    Are you experiencing any covid symptoms ? no    Do you have any infections or rash we should be aware of ?no      Do you have the Hibiclens soap to use the night before and the morning of surgery ?n/a    Nothing to eat or drink after midnight, only a sip of water to take any medication instructed to take the night before.to follow directions per Dr. Garcia  Wear comfortable clothing, leave any valuables at home, remove any jewelry and body piercing . yes

## 2025-01-13 ENCOUNTER — ANESTHESIA EVENT (OUTPATIENT)
Dept: ENDOSCOPY | Age: 65
End: 2025-01-13
Payer: COMMERCIAL

## 2025-01-14 ENCOUNTER — HOSPITAL ENCOUNTER (OUTPATIENT)
Age: 65
Setting detail: OUTPATIENT SURGERY
Discharge: HOME | End: 2025-01-14
Attending: INTERNAL MEDICINE | Admitting: INTERNAL MEDICINE
Payer: COMMERCIAL

## 2025-01-14 ENCOUNTER — TELEPHONE (OUTPATIENT)
Dept: GASTROENTEROLOGY | Age: 65
End: 2025-01-14

## 2025-01-14 ENCOUNTER — ANESTHESIA (OUTPATIENT)
Dept: ENDOSCOPY | Age: 65
End: 2025-01-14
Payer: COMMERCIAL

## 2025-01-14 VITALS
RESPIRATION RATE: 12 BRPM | SYSTOLIC BLOOD PRESSURE: 132 MMHG | TEMPERATURE: 97 F | HEART RATE: 63 BPM | WEIGHT: 208 LBS | HEIGHT: 70 IN | DIASTOLIC BLOOD PRESSURE: 85 MMHG | BODY MASS INDEX: 29.78 KG/M2 | OXYGEN SATURATION: 96 %

## 2025-01-14 DIAGNOSIS — D64.9 ANEMIA, UNSPECIFIED: ICD-10-CM

## 2025-01-14 DIAGNOSIS — R10.13 DYSPEPSIA: ICD-10-CM

## 2025-01-14 DIAGNOSIS — Z80.0 FAMILY HISTORY OF COLON CANCER: ICD-10-CM

## 2025-01-14 PROCEDURE — 3700000001 HC ADD 15 MINUTES (ANESTHESIA): Performed by: INTERNAL MEDICINE

## 2025-01-14 PROCEDURE — 2500000003 HC RX 250 WO HCPCS: Performed by: NURSE ANESTHETIST, CERTIFIED REGISTERED

## 2025-01-14 PROCEDURE — 45381 COLONOSCOPY SUBMUCOUS NJX: CPT | Performed by: INTERNAL MEDICINE

## 2025-01-14 PROCEDURE — 45385 COLONOSCOPY W/LESION REMOVAL: CPT | Performed by: INTERNAL MEDICINE

## 2025-01-14 PROCEDURE — 43239 EGD BIOPSY SINGLE/MULTIPLE: CPT | Performed by: INTERNAL MEDICINE

## 2025-01-14 PROCEDURE — 2709999900 HC NON-CHARGEABLE SUPPLY: Performed by: INTERNAL MEDICINE

## 2025-01-14 PROCEDURE — 7100000010 HC PHASE II RECOVERY - FIRST 15 MIN: Performed by: INTERNAL MEDICINE

## 2025-01-14 PROCEDURE — 6360000002 HC RX W HCPCS: Performed by: NURSE ANESTHETIST, CERTIFIED REGISTERED

## 2025-01-14 PROCEDURE — 6360000002 HC RX W HCPCS: Performed by: ANESTHESIOLOGY

## 2025-01-14 PROCEDURE — 3609010600 HC COLONOSCOPY POLYPECTOMY SNARE/COLD BIOPSY: Performed by: INTERNAL MEDICINE

## 2025-01-14 PROCEDURE — 45390 COLONOSCOPY W/RESECTION: CPT | Performed by: INTERNAL MEDICINE

## 2025-01-14 PROCEDURE — 88305 TISSUE EXAM BY PATHOLOGIST: CPT

## 2025-01-14 PROCEDURE — 3609012400 HC EGD TRANSORAL BIOPSY SINGLE/MULTIPLE: Performed by: INTERNAL MEDICINE

## 2025-01-14 PROCEDURE — C1889 IMPLANT/INSERT DEVICE, NOC: HCPCS | Performed by: INTERNAL MEDICINE

## 2025-01-14 PROCEDURE — 7100000011 HC PHASE II RECOVERY - ADDTL 15 MIN: Performed by: INTERNAL MEDICINE

## 2025-01-14 PROCEDURE — 2580000003 HC RX 258: Performed by: ANESTHESIOLOGY

## 2025-01-14 PROCEDURE — 3700000000 HC ANESTHESIA ATTENDED CARE: Performed by: INTERNAL MEDICINE

## 2025-01-14 DEVICE — WORKING LENGTH 235CM, WORKING CHANNEL 2.8MM
Type: IMPLANTABLE DEVICE | Site: ESOPHAGUS | Status: FUNCTIONAL
Brand: RESOLUTION 360 CLIP

## 2025-01-14 DEVICE — CLIP
Type: IMPLANTABLE DEVICE | Site: ESOPHAGUS | Status: FUNCTIONAL
Brand: MANTIS™ CLIP

## 2025-01-14 RX ORDER — LIDOCAINE HYDROCHLORIDE 10 MG/ML
1 INJECTION, SOLUTION EPIDURAL; INFILTRATION; INTRACAUDAL; PERINEURAL
Status: COMPLETED | OUTPATIENT
Start: 2025-01-14 | End: 2025-01-14

## 2025-01-14 RX ORDER — PROPOFOL 10 MG/ML
INJECTION, EMULSION INTRAVENOUS
Status: DISCONTINUED | OUTPATIENT
Start: 2025-01-14 | End: 2025-01-14 | Stop reason: SDUPTHER

## 2025-01-14 RX ORDER — SIMETHICONE 40MG/0.6ML
SUSPENSION, DROPS(FINAL DOSAGE FORM)(ML) ORAL PRN
Status: DISCONTINUED | OUTPATIENT
Start: 2025-01-14 | End: 2025-01-14 | Stop reason: ALTCHOICE

## 2025-01-14 RX ORDER — PANTOPRAZOLE SODIUM 40 MG/1
40 TABLET, DELAYED RELEASE ORAL
Qty: 120 TABLET | Refills: 0 | Status: SHIPPED | OUTPATIENT
Start: 2025-01-14 | End: 2025-03-15

## 2025-01-14 RX ORDER — SODIUM CHLORIDE 0.9 % (FLUSH) 0.9 %
5-40 SYRINGE (ML) INJECTION PRN
Status: DISCONTINUED | OUTPATIENT
Start: 2025-01-14 | End: 2025-01-14 | Stop reason: HOSPADM

## 2025-01-14 RX ORDER — SODIUM CHLORIDE 9 MG/ML
INJECTION, SOLUTION INTRAVENOUS CONTINUOUS
Status: DISCONTINUED | OUTPATIENT
Start: 2025-01-14 | End: 2025-01-14 | Stop reason: HOSPADM

## 2025-01-14 RX ORDER — SODIUM CHLORIDE 9 MG/ML
INJECTION, SOLUTION INTRAVENOUS PRN
Status: DISCONTINUED | OUTPATIENT
Start: 2025-01-14 | End: 2025-01-14 | Stop reason: HOSPADM

## 2025-01-14 RX ORDER — SODIUM CHLORIDE 0.9 % (FLUSH) 0.9 %
5-40 SYRINGE (ML) INJECTION EVERY 12 HOURS SCHEDULED
Status: DISCONTINUED | OUTPATIENT
Start: 2025-01-14 | End: 2025-01-14 | Stop reason: HOSPADM

## 2025-01-14 RX ORDER — EPHEDRINE SULFATE/0.9% NACL/PF 25 MG/5 ML
SYRINGE (ML) INTRAVENOUS
Status: DISCONTINUED | OUTPATIENT
Start: 2025-01-14 | End: 2025-01-14 | Stop reason: SDUPTHER

## 2025-01-14 RX ORDER — FENTANYL CITRATE 50 UG/ML
INJECTION, SOLUTION INTRAMUSCULAR; INTRAVENOUS
Status: DISCONTINUED | OUTPATIENT
Start: 2025-01-14 | End: 2025-01-14 | Stop reason: SDUPTHER

## 2025-01-14 RX ORDER — MIDAZOLAM HYDROCHLORIDE 1 MG/ML
INJECTION, SOLUTION INTRAMUSCULAR; INTRAVENOUS
Status: DISCONTINUED | OUTPATIENT
Start: 2025-01-14 | End: 2025-01-14 | Stop reason: SDUPTHER

## 2025-01-14 RX ADMIN — FENTANYL CITRATE 50 MCG: 50 INJECTION INTRAMUSCULAR; INTRAVENOUS at 14:32

## 2025-01-14 RX ADMIN — PROPOFOL 50 MG: 10 INJECTION, EMULSION INTRAVENOUS at 14:36

## 2025-01-14 RX ADMIN — PROPOFOL 50 MG: 10 INJECTION, EMULSION INTRAVENOUS at 14:19

## 2025-01-14 RX ADMIN — FENTANYL CITRATE 50 MCG: 50 INJECTION INTRAMUSCULAR; INTRAVENOUS at 14:14

## 2025-01-14 RX ADMIN — LIDOCAINE HYDROCHLORIDE 1 ML: 10 INJECTION, SOLUTION EPIDURAL; INFILTRATION; INTRACAUDAL; PERINEURAL at 13:07

## 2025-01-14 RX ADMIN — PROPOFOL 50 MG: 10 INJECTION, EMULSION INTRAVENOUS at 14:30

## 2025-01-14 RX ADMIN — MIDAZOLAM 1 MG: 1 INJECTION INTRAMUSCULAR; INTRAVENOUS at 14:44

## 2025-01-14 RX ADMIN — PROPOFOL 100 MG: 10 INJECTION, EMULSION INTRAVENOUS at 14:12

## 2025-01-14 RX ADMIN — MIDAZOLAM 1 MG: 1 INJECTION INTRAMUSCULAR; INTRAVENOUS at 14:14

## 2025-01-14 RX ADMIN — PROPOFOL 50 MG: 10 INJECTION, EMULSION INTRAVENOUS at 14:41

## 2025-01-14 RX ADMIN — PROPOFOL 50 MG: 10 INJECTION, EMULSION INTRAVENOUS at 14:14

## 2025-01-14 RX ADMIN — SODIUM CHLORIDE: 9 INJECTION, SOLUTION INTRAVENOUS at 13:07

## 2025-01-14 RX ADMIN — EPHEDRINE SULFATE 10 MG: 5 INJECTION INTRAVENOUS at 14:46

## 2025-01-14 RX ADMIN — PROPOFOL 50 MG: 10 INJECTION, EMULSION INTRAVENOUS at 14:24

## 2025-01-14 ASSESSMENT — PAIN - FUNCTIONAL ASSESSMENT
PAIN_FUNCTIONAL_ASSESSMENT: NONE - DENIES PAIN
PAIN_FUNCTIONAL_ASSESSMENT: 0-10

## 2025-01-14 ASSESSMENT — ENCOUNTER SYMPTOMS
TROUBLE SWALLOWING: 0
BACK PAIN: 0
SORE THROAT: 0
APNEA: 0
SHORTNESS OF BREATH: 1
EYES NEGATIVE: 1
COUGH: 1

## 2025-01-14 ASSESSMENT — LIFESTYLE VARIABLES: SMOKING_STATUS: 1

## 2025-01-14 NOTE — OP NOTE
EGD report    Esophagogastroduodenoscopy (EGD) Procedure Note    Procedure:  EGD with biopsies    Procedure Date: 1/14/2025    Indications: Dyspepsia, anemia    Sedation: MAC    Attending Physician:  Dr. Yinka Garcia MD    Assistant:  None    Procedure Details:    Informed consent was obtained for the procedure, including sedation. Risks of infection, perforation, hemorrhage, adverse drug reaction, and aspiration were discussed. The patient was placed in the left lateral decubitus position. The patient was monitored continuously with ECG tracing, pulse oximetry, blood pressure monitoring, and direct observation.      The gastroscope was inserted into the mouth and advanced under direct vision to second portion of the duodenum.  A careful inspection was made as the gastroscope was withdrawn, including a retroflexed view of the proximal stomach; findings and interventions are described below. Appropriate photodocumentation was obtained.    Findings:  Retropharyngeal area was grossly normal appearing     Esophagus: The GE junction appeared narrow with inflammation characterized as erythema and ulceration noted.  Will likely characterize this as grade B esophagitis.  Distal esophageal biopsies obtained.                          Esophagogastric markings: Diaphragmatic hiatus-40 cm; GE junction-37 cm, 3 cm hiatal hernia noted     Stomach:    Fundus: normal    Body: normal    Antrum: normal  Biopsies obtained to rule out H. pylori     Duodenum:     Bulb: Multiple erosions and erythema patches, biopsies obtained to rule out celiac disease    First part: Normal    Second Part: Normal      Complications:  None           Estimated blood loss: Minimal    Disposition: Home           Condition: Stable    Specimen Removed: Distal esophagus, stomach, duodenum    Impression:    The GE junction appeared narrow with inflammation characterized as erythema and ulceration noted.  Will likely characterize this as grade B esophagitis.

## 2025-01-14 NOTE — ANESTHESIA PRE PROCEDURE
Department of Anesthesiology  Preprocedure Note       Name:  Ovi Martins   Age:  64 y.o.  :  1960                                          MRN:  [id number]         Date:  2025      Surgeon: Surgeon(s):  Yinka Garcia MD    Procedure: Procedure(s):  ESOPHAGOGASTRODUODENOSCOPY BIOPSY  COLORECTAL CANCER SCREENING, HIGH RISK    Medications prior to admission:   Prior to Admission medications    Medication Sig Start Date End Date Taking? Authorizing Provider   losartan (COZAAR) 50 MG tablet Take 1 tablet by mouth once daily 12/3/24  Yes Eran Daíz MD   Varenicline Tartrate, Starter, 0.5 MG X 11 & 1 MG X 42 TBPK Take 0.5 mg by mouth daily for 3 days, THEN 0.5 mg 2 times daily for 4 days, THEN 1 mg 2 times daily.  Patient not taking: Reported on 2025 10/18/24 1/10/25  Tong Graff MD   polyethylene glycol-electrolytes (NULYTELY) 420 g solution Take as directed for bowel prep.  Patient not taking: Reported on 2025   Yinka Garcia MD   gabapentin (NEURONTIN) 100 MG capsule Take 1 capsule by mouth at bedtime for 30 days. 24  Eran Díaz MD   sildenafil (REVATIO) 20 MG tablet Take 1 tablet by mouth daily as needed (erectile dysfunction) 24   Eran Díaz MD       Current medications:    Current Facility-Administered Medications   Medication Dose Route Frequency Provider Last Rate Last Admin   • sodium chloride flush 0.9 % injection 5-40 mL  5-40 mL IntraVENous 2 times per day Sky Candelaria MD       • sodium chloride flush 0.9 % injection 5-40 mL  5-40 mL IntraVENous PRN Sky Candelaria MD       • 0.9 % sodium chloride infusion   IntraVENous PRN Sky Candelaria MD       • 0.9 % sodium chloride infusion   IntraVENous Continuous Sky Candelaria MD       • lidocaine PF 1 % injection 1 mL  1 mL IntraDERmal Once PRN Sky Candelaria MD           Allergies:    Allergies   Allergen Reactions   • Chantix [Varenicline] Rash       Problem List:    Patient Active Problem List

## 2025-01-14 NOTE — ANESTHESIA POSTPROCEDURE EVALUATION
Department of Anesthesiology  Postprocedure Note    Patient: Ovi Martins  MRN: [id number]  YOB: 1960  Date of evaluation: 1/14/2025    Procedure Summary       Date: 01/14/25 Room / Location: [place] [address] / [facility]    Anesthesia Start: 1353 Anesthesia Stop: 1526    Procedures:       ESOPHAGOGASTRODUODENOSCOPY BIOPSY (Esophagus)      COLORECTAL CANCER SCREENING, HIGH RISK HOT SNARE POLYPECTOMY WITH EMR. CLIP PLACEMENT X3 AND TATTOOING Diagnosis:       Dyspepsia      Anemia, unspecified      Family history of colon cancer      (Dyspepsia [R10.13])      (Anemia, unspecified [D64.9])      (Family history of colon cancer [Z80.0])    Surgeons: Yinka Garcia MD Responsible Provider: Joceline Park MD    Anesthesia Type: General ASA Status: 2            Anesthesia Type: General    Juliano Phase I: Juliano Score: 10    Juliano Phase II: Julaino Score: 10    Anesthesia Post Evaluation    Comments: POST- ANESTHESIA EVALUATION       Pt Name: Ovi Martins  MRN: [id number]  YOB: 1960  Date of evaluation: 1/14/2025  Time:  4:03 PM      /85   Pulse 63   Temp 97 °F (36.1 °C)   Resp 12   Ht 1.778 m (5' 10\")   Wt 94.3 kg (208 lb)   SpO2 96%   BMI 29.84 kg/m²      Consciousness Level  Awake  Cardiopulmonary Status  Stable  Pain Adequately Treated YES  Nausea / Vomiting  NO  Adequate Hydration  YES  Anesthesia Related Complications NONE      Electronically signed by Joceline Park MD on 1/14/2025 at 4:03 PM           No notable events documented.

## 2025-01-14 NOTE — OP NOTE
Colonoscopy report    Colonoscopy Procedure Note    Procedure:  Colonoscopy with polypectomy with snare, endoscopic mucosal resection, submucosal tattoo injection    Procedure Date: 1/14/2025    Indications: Family history of colon cancer    Sedation:  MAC    Attending Physician:  Dr. Yinka Garcia MD.     Assistant Physician: None    Consent:   Informed consent was obtained for the procedure after explaining the risks including bleeding, perforation, aspiration, arrhythmia, risks related to sedation, reaction to medications and rarely death, benefits and alternatives to the patient. The patient verbalized understanding and agreed to proceed with the procedure.       Procedure Details:  The patient was placed in the left lateral decubitus position.  Oxygen and cardiac monitoring equipment was attached. The patient's vital signs were monitored continuously  throughout the procedure. After appropriate sedation was achieved, a rectal examination was performed.  The pediatric colonoscopy was inserted into the rectum and advanced under direct vision to the cecum which was identified by ileocecal valve and appendiceal orifice.  The quality of the colonic preparation was good to fair.  A careful inspection was made as the colonoscope was withdrawn, including a retroflexed view of the rectum; findings and interventions are described below.  Appropriate photodocumentation was obtained.           Complications:  None    Estimated blood loss:  Minimal           Disposition:  Home           Condition: stable    Withdrawal Time: 35 minutes    Findings:   The bowel prep was fair to good.  A total of 4 polyps noted in the cecum and ascending colon measuring 3 to 6 mm, resected with a combination of cold and hot snare.  A laterally spreading 4 cm polyp noted in the transverse colon (0-IIa + Is).  Preparations were made for endoscopic mucosal resection.  The base of the polyp was injected [place].  Adequate lift was achieved.

## 2025-01-14 NOTE — DISCHARGE INSTRUCTIONS
DISCHARGE INSTRUCTIONS FOR COLONOSCOPY AND EGD    In order to continue your care at home, please follow the instructions below.    For General Anesthesia:  Do not drink any alcoholic beverages or make any legal or important decisions for 24 hours.    Do not drive or operate machinery for 24 hours.  You may return to work after 24 hours.        Diet    Drink plenty of fluids after surgery, unless you are on a fluid restriction.   After general anesthesia, start out eating lightly (broth, soup, bread, etc.) advancing as tolerated to your usual diet.  Try to avoid spicy or greasy/fatty foods for 48 hours due to the EGD.   Avoid milk/milk product for several hours.     If your gag reflex has not returned but you are able to swallow, cut food into small bites, chew food thoroughly and drink fluids carefully for the next 2 hours.    Medications  Take medications as ordered by your surgeon.    Activities  Limit your activities for 24 hours.  Walk around to pass gas.  You may shower.    Call your surgeon for the following:  If you have abdominal pain that is not relieved by passing gas.   For an oral temperature (by mouth) is 101 degrees or higher, chills or excessive sweating.  You have increasing and progressive bleeding or drainage from surgery area.  Persistent nausea or vomiting  Vomiting blood (may be red or black)  Rectal bleeding (may be red, maroon, or black) or change in your bowel habits.  Severe sore throat or neck pain  If you are unable to urinate within 8 hours of surgery  Redness or swelling at the IV site.  For any questions or concerns you may have

## 2025-01-14 NOTE — PROGRESS NOTES
No [id number] Provider sent post-op Rx to [facility] [address] [place] [address] [phone]

## 2025-01-14 NOTE — TELEPHONE ENCOUNTER
----- Message from Dr. Yinka Garcia MD sent at 1/14/2025  3:28 PM EST -----  Please arrange clinic follow-up for this patient in 2 months.

## 2025-01-14 NOTE — H&P
HISTORY and PHYSICAL  [facility]       NAME:  Ovi Martins  MRN: [id number]   YOB: 1960   Date: 1/14/2025   Age: 64 y.o.  Gender: male       COMPLAINT AND PRESENT HISTORY:     Ovi Martins is 64 y.o.,  male, presents for ESOPHAGOGASTRODUODENOSCOPY BIOPSY, COLORECTAL CANCER SCREENING, HIGH RISK   Primary dx: Dyspepsia [R10.13]  Anemia, unspecified [D64.9]  Family history of colon cancer [Z80.0].    Office note per Dr Garcia on 1/2/2025  HISTORY OF PRESENT ILLNESS: Mr.Jeffrey ALBERTO Martins is a 64 y.o. male with a past history remarkable for eczema, hypertension, referred for evaluation of anemia.  The patient reports that he has occasional dyspepsia particularly when he eats late at night.  He denies any rectal bleeding, altered bowel habits, nausea/vomiting and dysphagia.  He never had EGD before.  He had a direct laryngoscopy with ENT 1 to 2 years ago for abnormal PET/CT but was unremarkable except for possible reflux changes.  He had a colonoscopy 10 to 15 years ago that was unremarkable.  He does have significant family members with colon cancer including both mother and father.  Previous Endoscopies     No prior EGD.  Colonoscopy 10 to 15 years ago.    UPDATE 1/14/2025  Ovi Martins is 64 y.o.,  male, will be having a Colonoscopy and EGD.  Prior Colonoscopy 10-15 years ago  No prior EGD was done     Patient denies any  FH of Colon or esophogeal  Cancer.   Patient has positive FH of Colon Cancer in father and maternal grandfather.    Patient reports infrequent GI /Rectal bleeding on toilet tissue r/t hemorrhoid, Denies experiencing dark tarry stools.    No nausea / vomiting.  No diarrhea or constipation. No abdominal pains or cramping. Very infrequent heartburn.  Denies dysphagia.  no changes in the color, caliber or  consistency of the stools. No fever or chills.  Denies chest pain.  Occasional SOBOE    Prep fully completed: yes. Pt reports his last BM is clear liquid

## 2025-01-17 LAB — SURGICAL PATHOLOGY REPORT: NORMAL

## 2025-01-20 ENCOUNTER — HOSPITAL ENCOUNTER (OUTPATIENT)
Dept: CT IMAGING | Age: 65
Discharge: HOME | End: 2025-01-22
Attending: INTERNAL MEDICINE
Payer: COMMERCIAL

## 2025-01-20 DIAGNOSIS — R91.8 MASS OF RIGHT LUNG: ICD-10-CM

## 2025-01-20 PROCEDURE — 71250 CT THORAX DX C-: CPT

## 2025-01-24 ENCOUNTER — OFFICE VISIT (OUTPATIENT)
Dept: PULMONOLOGY | Age: 65
End: 2025-01-24
Payer: COMMERCIAL

## 2025-01-24 VITALS
DIASTOLIC BLOOD PRESSURE: 69 MMHG | HEIGHT: 70 IN | SYSTOLIC BLOOD PRESSURE: 137 MMHG | BODY MASS INDEX: 30.49 KG/M2 | WEIGHT: 213 LBS | OXYGEN SATURATION: 94 % | HEART RATE: 70 BPM | RESPIRATION RATE: 16 BRPM

## 2025-01-24 DIAGNOSIS — J44.9 STAGE 2 MODERATE COPD BY GOLD CLASSIFICATION (HCC): Primary | ICD-10-CM

## 2025-01-24 DIAGNOSIS — R91.8 MULTIPLE PULMONARY NODULES: ICD-10-CM

## 2025-01-24 DIAGNOSIS — J98.11 ROUND ATELECTASIS: ICD-10-CM

## 2025-01-24 DIAGNOSIS — Z72.0 TOBACCO ABUSE: ICD-10-CM

## 2025-01-24 PROCEDURE — 1123F ACP DISCUSS/DSCN MKR DOCD: CPT | Performed by: INTERNAL MEDICINE

## 2025-01-24 PROCEDURE — 3075F SYST BP GE 130 - 139MM HG: CPT | Performed by: INTERNAL MEDICINE

## 2025-01-24 PROCEDURE — 1159F MED LIST DOCD IN RCRD: CPT | Performed by: INTERNAL MEDICINE

## 2025-01-24 PROCEDURE — 3078F DIAST BP <80 MM HG: CPT | Performed by: INTERNAL MEDICINE

## 2025-01-24 PROCEDURE — 99214 OFFICE O/P EST MOD 30 MIN: CPT | Performed by: INTERNAL MEDICINE

## 2025-01-24 RX ORDER — UMECLIDINIUM BROMIDE AND VILANTEROL TRIFENATATE 62.5; 25 UG/1; UG/1
1 POWDER RESPIRATORY (INHALATION) DAILY
Qty: 1 EACH | Refills: 2 | Status: SHIPPED | OUTPATIENT
Start: 2025-01-24 | End: 2025-04-24

## 2025-01-24 NOTE — PROGRESS NOTES
New diagnosis, Found on 12/25 EKG that also showed inferior STEMI  Continue AC     consistent with round atelectasis. No clear progression.  2. Left lower lobe subpleural area of nodular scarring or rounded area  associated with linear atelectasis similar to prior.  3. Left upper lobe subpleural nodules are stable from prior without interval  progression or new lesion.  4. Bilateral adrenal adenomas.  5. Small hiatal hernia.  6. Cardiomegaly.  7. COPD.      CT Lung Screen (Initial/Annual/Baseline) 09/18/2024    Narrative  EXAMINATION:  LOW DOSE SCREENING CT OF THE CHEST WITHOUT CONTRAST    9/18/2024 4:59 pm    TECHNIQUE:  Low dose lung cancer screening CT of the chest was performed without the  administration of intravenous contrast.  Multiplanar reformatted images are  provided for review.  Automated exposure control, iterative reconstruction,  and/or weight based adjustment of the mA/kV was utilized to reduce the  radiation dose to as low as reasonably achievable.    COMPARISON:  None.    HISTORY:  Screening.    History: ORDERING SYSTEM PROVIDED HISTORY: Personal history of tobacco use  TECHNOLOGIST PROVIDED HISTORY:  Age: Patient is 64 y.o.    Smoking History:   Tobacco Use  Smoking status: Every Day  Packs/day: 2.00  Years: 2.0 packs/day for 52.1 years (104.2 ttl pk-yrs)  Types: Cigarettes  Start date: 8/7/1972  Smokeless tobacco: Never  Tobacco comments: tried Chantix      Date of last lung cancer screening: No previous lung cancer screening exam  Is there documentation of shared decision making?->Yes  Is this a low dose CT or a routine CT?->Low Dose CT  Is this the first (baseline) CT or an annual exam?->Baseline  Does the patient show any signs or symptoms of lung cancer?->No  Smoking Status?->Every Day  Pack Years->104  Reason for Exam: current smoker x 52 years 2 pk.day no current complaints.    FINDINGS:  Mediastinum:  Thoracic aorta normal in course and caliber.  No enlarged  mediastinal or hilar lymph nodes by imaging size criteria.  Heart size within  normal limits.  Coronary artery

## 2025-01-26 PROBLEM — R91.8 MULTIPLE PULMONARY NODULES: Status: ACTIVE | Noted: 2025-01-26

## 2025-01-26 PROBLEM — J44.9 STAGE 2 MODERATE COPD BY GOLD CLASSIFICATION (HCC): Status: ACTIVE | Noted: 2025-01-26

## 2025-01-26 PROBLEM — J98.11 ROUND ATELECTASIS: Status: ACTIVE | Noted: 2025-01-26

## 2025-02-24 DIAGNOSIS — F18.10 ABUSE OF SMOKED SUBSTANCE (HCC): ICD-10-CM

## 2025-02-24 DIAGNOSIS — F10.20 ALCOHOLISM (HCC): ICD-10-CM

## 2025-02-24 DIAGNOSIS — G62.1 ALCOHOL-INDUCED POLYNEUROPATHY: ICD-10-CM

## 2025-02-24 DIAGNOSIS — I10 HYPERTENSION, ESSENTIAL: ICD-10-CM

## 2025-02-24 RX ORDER — LOSARTAN POTASSIUM 50 MG/1
50 TABLET ORAL DAILY
Qty: 90 TABLET | Refills: 0 | Status: SHIPPED | OUTPATIENT
Start: 2025-02-24

## 2025-03-04 ENCOUNTER — OFFICE VISIT (OUTPATIENT)
Dept: INTERNAL MEDICINE CLINIC | Age: 65
End: 2025-03-04
Payer: COMMERCIAL

## 2025-03-04 VITALS
DIASTOLIC BLOOD PRESSURE: 96 MMHG | WEIGHT: 212 LBS | HEART RATE: 89 BPM | HEIGHT: 70 IN | SYSTOLIC BLOOD PRESSURE: 152 MMHG | OXYGEN SATURATION: 97 % | BODY MASS INDEX: 30.35 KG/M2

## 2025-03-04 DIAGNOSIS — M54.12 CERVICAL RADICULOPATHY: ICD-10-CM

## 2025-03-04 DIAGNOSIS — M48.02 CERVICAL STENOSIS OF SPINE: Primary | ICD-10-CM

## 2025-03-04 DIAGNOSIS — I10 PRIMARY HYPERTENSION: ICD-10-CM

## 2025-03-04 PROCEDURE — 3077F SYST BP >= 140 MM HG: CPT | Performed by: INTERNAL MEDICINE

## 2025-03-04 PROCEDURE — 3080F DIAST BP >= 90 MM HG: CPT | Performed by: INTERNAL MEDICINE

## 2025-03-04 PROCEDURE — 1123F ACP DISCUSS/DSCN MKR DOCD: CPT | Performed by: INTERNAL MEDICINE

## 2025-03-04 PROCEDURE — 99214 OFFICE O/P EST MOD 30 MIN: CPT | Performed by: INTERNAL MEDICINE

## 2025-03-04 PROCEDURE — 1125F AMNT PAIN NOTED PAIN PRSNT: CPT | Performed by: INTERNAL MEDICINE

## 2025-03-04 PROCEDURE — 1159F MED LIST DOCD IN RCRD: CPT | Performed by: INTERNAL MEDICINE

## 2025-03-04 RX ORDER — GABAPENTIN 100 MG/1
100 CAPSULE ORAL 3 TIMES DAILY
Qty: 90 CAPSULE | Refills: 0 | Status: SHIPPED | OUTPATIENT
Start: 2025-03-04 | End: 2025-04-03

## 2025-03-04 SDOH — ECONOMIC STABILITY: INCOME INSECURITY: IN THE LAST 12 MONTHS, WAS THERE A TIME WHEN YOU WERE NOT ABLE TO PAY THE MORTGAGE OR RENT ON TIME?: NO

## 2025-03-04 SDOH — ECONOMIC STABILITY: FOOD INSECURITY: WITHIN THE PAST 12 MONTHS, YOU WORRIED THAT YOUR FOOD WOULD RUN OUT BEFORE YOU GOT MONEY TO BUY MORE.: NEVER TRUE

## 2025-03-04 SDOH — ECONOMIC STABILITY: FOOD INSECURITY: WITHIN THE PAST 12 MONTHS, THE FOOD YOU BOUGHT JUST DIDN'T LAST AND YOU DIDN'T HAVE MONEY TO GET MORE.: NEVER TRUE

## 2025-03-04 ASSESSMENT — PATIENT HEALTH QUESTIONNAIRE - PHQ9
1. LITTLE INTEREST OR PLEASURE IN DOING THINGS: NOT AT ALL
SUM OF ALL RESPONSES TO PHQ QUESTIONS 1-9: 0
2. FEELING DOWN, DEPRESSED OR HOPELESS: NOT AT ALL
SUM OF ALL RESPONSES TO PHQ QUESTIONS 1-9: 0
2. FEELING DOWN, DEPRESSED OR HOPELESS: NOT AT ALL
1. LITTLE INTEREST OR PLEASURE IN DOING THINGS: NOT AT ALL
SUM OF ALL RESPONSES TO PHQ9 QUESTIONS 1 & 2: 0
SUM OF ALL RESPONSES TO PHQ QUESTIONS 1-9: 0
SUM OF ALL RESPONSES TO PHQ QUESTIONS 1-9: 0

## 2025-03-04 NOTE — PROGRESS NOTES
\"Have you been to the ER, urgent care clinic since your last visit?  Hospitalized since your last visit?\"    EGD bx 1/14    “Have you seen or consulted any other health care providers outside our system since your last visit?”    NO             
Wellness Visit (Medicare Advantage)  01/01/2025       Past Surgical History:    Past Surgical History:   Procedure Laterality Date    COLONOSCOPY      COLONOSCOPY N/A 1/14/2025    COLONOSCOPY HOT SNARE POLYPECTOMY WITH EMR. CLIP PLACEMENT X3 AND TATTOOING performed by Yinka Garcia MD at [facility] ENDO    TONSILLECTOMY      UPPER GASTROINTESTINAL ENDOSCOPY N/A 1/14/2025    ESOPHAGOGASTRODUODENOSCOPY BIOPSY performed by Yinka Garcia MD at [facility] ENDO        Medications:      Current Outpatient Medications:     losartan (COZAAR) 50 MG tablet, Take 1 tablet by mouth once daily, Disp: 90 tablet, Rfl: 0    umeclidinium-vilanterol (ANORO ELLIPTA) 62.5-25 MCG/ACT inhaler, Inhale 1 puff into the lungs daily, Disp: 1 each, Rfl: 2    pantoprazole (PROTONIX) 40 MG tablet, Take 1 tablet by mouth 2 times daily (before meals), Disp: 120 tablet, Rfl: 0    polyethylene glycol-electrolytes (NULYTELY) 420 g solution, Take as directed for bowel prep. (Patient not taking: Reported on 1/14/2025), Disp: 1 each, Rfl: 0    gabapentin (NEURONTIN) 100 MG capsule, Take 1 capsule by mouth at bedtime for 30 days. (Patient not taking: Reported on 3/4/2025), Disp: 30 capsule, Rfl: 3    sildenafil (REVATIO) 20 MG tablet, Take 1 tablet by mouth daily as needed (erectile dysfunction) (Patient not taking: Reported on 3/4/2025), Disp: 15 tablet, Rfl: 0    Allergies:      Chantix [varenicline]    Social History:    Tobacco:    reports that he has been smoking cigarettes. He started smoking about 52 years ago. He has a 105.1 pack-year smoking history. He has never used smokeless tobacco.  Alcohol:      reports current alcohol use.  Drug Use:  reports current drug use. Drug: Marijuana (Weed).    Family History:    Family History   Problem Relation Age of Onset    Other Mother     Colon Cancer Father        Review of Systems:    Positive and Negative as described in HPI    Constitutional:  negative for  fevers, chills, sweats, fatigue, and weight

## 2025-03-06 ENCOUNTER — HOSPITAL ENCOUNTER (OUTPATIENT)
Age: 65
Setting detail: SPECIMEN
Discharge: HOME | End: 2025-03-06

## 2025-03-06 DIAGNOSIS — M48.02 CERVICAL STENOSIS OF SPINE: ICD-10-CM

## 2025-03-06 DIAGNOSIS — I10 PRIMARY HYPERTENSION: ICD-10-CM

## 2025-03-06 LAB
BASOPHILS # BLD: 0.09 K/UL (ref 0–0.2)
BASOPHILS NFR BLD: 1 % (ref 0–2)
EOSINOPHIL # BLD: 0.16 K/UL (ref 0–0.44)
EOSINOPHILS RELATIVE PERCENT: 3 % (ref 1–4)
ERYTHROCYTE [DISTWIDTH] IN BLOOD BY AUTOMATED COUNT: 14.1 % (ref 11.8–14.4)
HCT VFR BLD AUTO: 41.6 % (ref 40.7–50.3)
HGB BLD-MCNC: 13.7 G/DL (ref 13–17)
IMM GRANULOCYTES # BLD AUTO: 0.03 K/UL (ref 0–0.3)
IMM GRANULOCYTES NFR BLD: 1 %
LYMPHOCYTES NFR BLD: 1.29 K/UL (ref 1.1–3.7)
LYMPHOCYTES RELATIVE PERCENT: 20 % (ref 24–43)
MCH RBC QN AUTO: 31.7 PG (ref 25.2–33.5)
MCHC RBC AUTO-ENTMCNC: 32.9 G/DL (ref 28.4–34.8)
MCV RBC AUTO: 96.3 FL (ref 82.6–102.9)
MONOCYTES NFR BLD: 0.67 K/UL (ref 0.1–1.2)
MONOCYTES NFR BLD: 10 % (ref 3–12)
NEUTROPHILS NFR BLD: 65 % (ref 36–65)
NEUTS SEG NFR BLD: 4.29 K/UL (ref 1.5–8.1)
NRBC BLD-RTO: 0 PER 100 WBC
PLATELET # BLD AUTO: 275 K/UL (ref 138–453)
PMV BLD AUTO: 10 FL (ref 8.1–13.5)
RBC # BLD AUTO: 4.32 M/UL (ref 4.21–5.77)
WBC OTHER # BLD: 6.5 K/UL (ref 3.5–11.3)

## 2025-03-10 RX ORDER — PANTOPRAZOLE SODIUM 40 MG/1
TABLET, DELAYED RELEASE ORAL
Qty: 90 TABLET | Refills: 1 | Status: SHIPPED | OUTPATIENT
Start: 2025-03-10 | End: 2025-03-13

## 2025-03-13 ENCOUNTER — TELEPHONE (OUTPATIENT)
Dept: GASTROENTEROLOGY | Age: 65
End: 2025-03-13

## 2025-03-13 ENCOUNTER — OFFICE VISIT (OUTPATIENT)
Dept: GASTROENTEROLOGY | Age: 65
End: 2025-03-13
Payer: COMMERCIAL

## 2025-03-13 VITALS
HEART RATE: 100 BPM | BODY MASS INDEX: 30.21 KG/M2 | RESPIRATION RATE: 20 BRPM | DIASTOLIC BLOOD PRESSURE: 68 MMHG | TEMPERATURE: 98.2 F | HEIGHT: 70 IN | WEIGHT: 211 LBS | SYSTOLIC BLOOD PRESSURE: 99 MMHG

## 2025-03-13 DIAGNOSIS — K22.70 BARRETT'S ESOPHAGUS WITHOUT DYSPLASIA: Primary | ICD-10-CM

## 2025-03-13 DIAGNOSIS — Z86.0100 HISTORY OF COLON POLYPS: ICD-10-CM

## 2025-03-13 DIAGNOSIS — Z12.11 COLON CANCER SCREENING: Primary | ICD-10-CM

## 2025-03-13 PROCEDURE — 1126F AMNT PAIN NOTED NONE PRSNT: CPT | Performed by: INTERNAL MEDICINE

## 2025-03-13 PROCEDURE — 3074F SYST BP LT 130 MM HG: CPT | Performed by: INTERNAL MEDICINE

## 2025-03-13 PROCEDURE — 99214 OFFICE O/P EST MOD 30 MIN: CPT | Performed by: INTERNAL MEDICINE

## 2025-03-13 PROCEDURE — 3078F DIAST BP <80 MM HG: CPT | Performed by: INTERNAL MEDICINE

## 2025-03-13 PROCEDURE — 1123F ACP DISCUSS/DSCN MKR DOCD: CPT | Performed by: INTERNAL MEDICINE

## 2025-03-13 PROCEDURE — G2211 COMPLEX E/M VISIT ADD ON: HCPCS | Performed by: INTERNAL MEDICINE

## 2025-03-13 PROCEDURE — 1159F MED LIST DOCD IN RCRD: CPT | Performed by: INTERNAL MEDICINE

## 2025-03-13 RX ORDER — PANTOPRAZOLE SODIUM 40 MG/1
40 TABLET, DELAYED RELEASE ORAL DAILY
Qty: 90 TABLET | Refills: 1 | Status: SHIPPED | OUTPATIENT
Start: 2025-03-13 | End: 2025-06-11

## 2025-03-13 ASSESSMENT — ENCOUNTER SYMPTOMS
SORE THROAT: 0
COLOR CHANGE: 0
RECTAL PAIN: 0
CHOKING: 0
ANAL BLEEDING: 0
CONSTIPATION: 1
VOMITING: 0
BLOOD IN STOOL: 0
TROUBLE SWALLOWING: 0
COUGH: 1
NAUSEA: 0
ABDOMINAL DISTENTION: 0
WHEEZING: 0
ABDOMINAL PAIN: 1
VOICE CHANGE: 0
DIARRHEA: 0

## 2025-03-13 NOTE — PROGRESS NOTES
Reason for Referral:       No referring provider defined for this encounter.    Chief Complaint   Patient presents with    Colonoscopy    Follow Up After Procedure     EGD           HISTORY OF PRESENT ILLNESS: Mr.Jeffrey ALBERTO Martins is a 64 y.o. male with a past history remarkable for eczema, hypertension, referred for evaluation of anemia.  The patient reports that he has occasional dyspepsia particularly when he eats late at night.  He denies any rectal bleeding, altered bowel habits, nausea/vomiting and dysphagia.  He never had EGD before.  He had a direct laryngoscopy with ENT 1 to 2 years ago for abnormal PET/CT but was unremarkable except for possible reflux changes.  He had a colonoscopy 10 to 15 years ago that was unremarkable.  He does have significant family members with colon cancer including both mother and father.    Interval history 3/13/2025:    Is currently on Protonix after he was noted to have grade B esophagitis.  The biopsy also demonstrated Ly's esophagus.    Previous Endoscopies  EGD 1/14/2025:  Impression:    The GE junction appeared narrow with inflammation characterized as erythema and ulceration noted.  Will likely characterize this as grade B esophagitis.  Distal esophageal biopsies obtained.  3 cm hiatal hernia observed.  Normal stomach, biopsies obtained to rule out H. pylori.  Erosive duodenopathy, biopsies obtained to rule out celiac disease    Colonoscopy 1/14/2025:  Endoscopic Impression:    Fair to good bowel prep.  4 polyps in cecum/ascending colon, 3 to 6 mm, resected with combination cold/hot snare.  Laterally spreading 4 cm transverse colon polyp resected with EMR technique in piecemeal fashion.  Margins ablated with soft tissue coag with snare tip, 3 clips placed.  Tattoo placed.  4 mm transverse colon polyp resected with cold snare.  Diverticulosis.  Large internal hemorrhoids and moderate external hemorrhoids  A.  Small intestine, duodenum, endoscopic biopsy:   Erosive

## 2025-03-13 NOTE — TELEPHONE ENCOUNTER
Pt saw Dr. Garcia today in clinic. EGD/Colonoscopy ordered. Schedule procedure in June. Writer went over bowel prep options with pt. Pt would like to try tablets this time. Writer printed SUTAB Copay Savings Card for pt. Pt has Medicare Part D Rx insurance. Writer informed pt he must sign letter and writer will send letter to pt's health plan.    Procedure scheduled/Dr Garcia  Procedure: EGD/Colonoscopy  Dx:  Ly's esophagus without dysplasia; Hx of colon polyps  Date: Tuesday 06/24/25  Time: 1:00 pm/Arrive at 11:00 am  Hospital: [facility]; Surgery Entrance, back of hospital  PAT Phone Call: Monday 06/16/25 at 12:30 pm  Bowel Prep instructions given: EGD Prep/SUTAB Split-Bowel Prep  In office/via phone: in office  Clearance needed: N/A  GLP-1: N/A    Pt informed they will receive a PAT Phone Call from a [facility] PAT Nurse 1-2 weeks prior to procedure. Pt informed they must complete PAT Call or procedure may be cancelled.     Pt informed it is required they must have a /responsible adult that takes them to their procedure, stays at the hospital (before, during, and after procedure), and drives pt home. Pt informed /responsible adult must stay inside the hospital during their procedure. Pt voiced understanding of  protocol for procedure.     Writer mailed SUTAB Letter to Health Plan on 03/14/25. Writer mailed to:  [facility]  Attn: Member Services  [address]  [address]  [place] [address]     And    Medicare Part D Paper Claims  [address] [id number]  [place] [address]

## 2025-03-14 ENCOUNTER — PREP FOR PROCEDURE (OUTPATIENT)
Dept: GASTROENTEROLOGY | Age: 65
End: 2025-03-14

## 2025-03-14 DIAGNOSIS — Z86.0100 HISTORY OF COLON POLYPS: ICD-10-CM

## 2025-03-14 DIAGNOSIS — K22.70 BARRETT'S ESOPHAGUS WITHOUT DYSPLASIA: ICD-10-CM

## 2025-03-14 RX ORDER — SOD SULF/POT CHLORIDE/MAG SULF 1.479 G
TABLET ORAL
Qty: 24 TABLET | Refills: 0 | Status: SHIPPED | OUTPATIENT
Start: 2025-03-14

## 2025-03-17 ENCOUNTER — OFFICE VISIT (OUTPATIENT)
Dept: NEUROSURGERY | Age: 65
End: 2025-03-17
Payer: COMMERCIAL

## 2025-03-17 VITALS
WEIGHT: 212.6 LBS | HEART RATE: 87 BPM | DIASTOLIC BLOOD PRESSURE: 81 MMHG | SYSTOLIC BLOOD PRESSURE: 136 MMHG | HEIGHT: 70 IN | BODY MASS INDEX: 30.43 KG/M2

## 2025-03-17 DIAGNOSIS — M48.02 STENOSIS OF CERVICAL SPINE WITH MYELOPATHY (HCC): Primary | ICD-10-CM

## 2025-03-17 DIAGNOSIS — Z72.0 TOBACCO USE: ICD-10-CM

## 2025-03-17 DIAGNOSIS — G99.2 STENOSIS OF CERVICAL SPINE WITH MYELOPATHY (HCC): Primary | ICD-10-CM

## 2025-03-17 DIAGNOSIS — Z87.81 HISTORY OF CERVICAL FRACTURE: ICD-10-CM

## 2025-03-17 PROCEDURE — 3079F DIAST BP 80-89 MM HG: CPT | Performed by: NURSE PRACTITIONER

## 2025-03-17 PROCEDURE — 3075F SYST BP GE 130 - 139MM HG: CPT | Performed by: NURSE PRACTITIONER

## 2025-03-17 PROCEDURE — 1123F ACP DISCUSS/DSCN MKR DOCD: CPT | Performed by: NURSE PRACTITIONER

## 2025-03-17 PROCEDURE — 1159F MED LIST DOCD IN RCRD: CPT | Performed by: NURSE PRACTITIONER

## 2025-03-17 PROCEDURE — 99204 OFFICE O/P NEW MOD 45 MIN: CPT | Performed by: NURSE PRACTITIONER
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
## 2025-03-19 ENCOUNTER — HOSPITAL ENCOUNTER (OUTPATIENT)
Dept: GENERAL RADIOLOGY | Facility: CLINIC | Age: 65
Discharge: HOME | End: 2025-03-21
Payer: COMMERCIAL

## 2025-03-19 ENCOUNTER — HOSPITAL ENCOUNTER (OUTPATIENT)
Facility: CLINIC | Age: 65
Discharge: HOME | End: 2025-03-21
Payer: COMMERCIAL

## 2025-03-19 DIAGNOSIS — G99.2 STENOSIS OF CERVICAL SPINE WITH MYELOPATHY (HCC): ICD-10-CM

## 2025-03-19 DIAGNOSIS — M48.02 STENOSIS OF CERVICAL SPINE WITH MYELOPATHY (HCC): ICD-10-CM

## 2025-03-19 PROCEDURE — 72050 X-RAY EXAM NECK SPINE 4/5VWS: CPT

## 2025-03-24 ENCOUNTER — HOSPITAL ENCOUNTER (OUTPATIENT)
Dept: MRI IMAGING | Facility: CLINIC | Age: 65
Discharge: HOME | End: 2025-03-26
Payer: COMMERCIAL

## 2025-03-24 DIAGNOSIS — M48.02 STENOSIS OF CERVICAL SPINE WITH MYELOPATHY (HCC): ICD-10-CM

## 2025-03-24 DIAGNOSIS — G99.2 STENOSIS OF CERVICAL SPINE WITH MYELOPATHY (HCC): ICD-10-CM

## 2025-03-24 PROCEDURE — 72141 MRI NECK SPINE W/O DYE: CPT

## 2025-03-26 SDOH — HEALTH STABILITY: PHYSICAL HEALTH: ON AVERAGE, HOW MANY DAYS PER WEEK DO YOU ENGAGE IN MODERATE TO STRENUOUS EXERCISE (LIKE A BRISK WALK)?: 3 DAYS

## 2025-03-26 ASSESSMENT — LIFESTYLE VARIABLES
HOW OFTEN DO YOU HAVE SIX OR MORE DRINKS ON ONE OCCASION: 3
HAS A RELATIVE, FRIEND, DOCTOR, OR ANOTHER HEALTH PROFESSIONAL EXPRESSED CONCERN ABOUT YOUR DRINKING OR SUGGESTED YOU CUT DOWN: YES, BUT NOT IN THE PAST YEAR
HOW MANY STANDARD DRINKS CONTAINING ALCOHOL DO YOU HAVE ON A TYPICAL DAY: 3 OR 4
HOW OFTEN DURING THE LAST YEAR HAVE YOU HAD A FEELING OF GUILT OR REMORSE AFTER DRINKING: LESS THAN MONTHLY
HOW OFTEN DURING THE LAST YEAR HAVE YOU FOUND THAT YOU WERE NOT ABLE TO STOP DRINKING ONCE YOU HAD STARTED: NEVER
HOW OFTEN DURING THE LAST YEAR HAVE YOU FAILED TO DO WHAT WAS NORMALLY EXPECTED FROM YOU BECAUSE OF DRINKING: NEVER
HAVE YOU OR SOMEONE ELSE BEEN INJURED AS A RESULT OF YOUR DRINKING: YES, BUT NOT IN THE PAST YEAR
HOW OFTEN DURING THE LAST YEAR HAVE YOU HAD A FEELING OF GUILT OR REMORSE AFTER DRINKING: LESS THAN MONTHLY
HAVE YOU OR SOMEONE ELSE BEEN INJURED AS A RESULT OF YOUR DRINKING: YES, BUT NOT IN THE PAST YEAR
HOW OFTEN DURING THE LAST YEAR HAVE YOU FAILED TO DO WHAT WAS NORMALLY EXPECTED FROM YOU BECAUSE OF DRINKING: NEVER
HAS A RELATIVE, FRIEND, DOCTOR, OR ANOTHER HEALTH PROFESSIONAL EXPRESSED CONCERN ABOUT YOUR DRINKING OR SUGGESTED YOU CUT DOWN: YES, BUT NOT IN THE PAST YEAR
HOW OFTEN DURING THE LAST YEAR HAVE YOU BEEN UNABLE TO REMEMBER WHAT HAPPENED THE NIGHT BEFORE BECAUSE YOU HAD BEEN DRINKING: NEVER
HOW OFTEN DO YOU HAVE A DRINK CONTAINING ALCOHOL: 2-3 TIMES A WEEK
HOW OFTEN DURING THE LAST YEAR HAVE YOU NEEDED AN ALCOHOLIC DRINK FIRST THING IN THE MORNING TO GET YOURSELF GOING AFTER A NIGHT OF HEAVY DRINKING: NEVER
HOW OFTEN DURING THE LAST YEAR HAVE YOU FOUND THAT YOU WERE NOT ABLE TO STOP DRINKING ONCE YOU HAD STARTED: NEVER
HOW MANY STANDARD DRINKS CONTAINING ALCOHOL DO YOU HAVE ON A TYPICAL DAY: 2
HOW OFTEN DURING THE LAST YEAR HAVE YOU NEEDED AN ALCOHOLIC DRINK FIRST THING IN THE MORNING TO GET YOURSELF GOING AFTER A NIGHT OF HEAVY DRINKING: NEVER
HOW OFTEN DO YOU HAVE A DRINK CONTAINING ALCOHOL: 4
HOW OFTEN DURING THE LAST YEAR HAVE YOU BEEN UNABLE TO REMEMBER WHAT HAPPENED THE NIGHT BEFORE BECAUSE YOU HAD BEEN DRINKING: NEVER

## 2025-03-26 ASSESSMENT — PATIENT HEALTH QUESTIONNAIRE - PHQ9
1. LITTLE INTEREST OR PLEASURE IN DOING THINGS: NOT AT ALL
SUM OF ALL RESPONSES TO PHQ QUESTIONS 1-9: 0
2. FEELING DOWN, DEPRESSED OR HOPELESS: NOT AT ALL
SUM OF ALL RESPONSES TO PHQ QUESTIONS 1-9: 0

## 2025-03-27 ENCOUNTER — OFFICE VISIT (OUTPATIENT)
Dept: INTERNAL MEDICINE CLINIC | Age: 65
End: 2025-03-27
Payer: COMMERCIAL

## 2025-03-27 VITALS
OXYGEN SATURATION: 95 % | BODY MASS INDEX: 30.81 KG/M2 | HEART RATE: 88 BPM | HEIGHT: 70 IN | SYSTOLIC BLOOD PRESSURE: 138 MMHG | WEIGHT: 215.2 LBS | DIASTOLIC BLOOD PRESSURE: 82 MMHG

## 2025-03-27 DIAGNOSIS — Z00.00 MEDICARE ANNUAL WELLNESS VISIT, SUBSEQUENT: Primary | ICD-10-CM

## 2025-03-27 PROCEDURE — 3079F DIAST BP 80-89 MM HG: CPT | Performed by: INTERNAL MEDICINE

## 2025-03-27 PROCEDURE — 1123F ACP DISCUSS/DSCN MKR DOCD: CPT | Performed by: INTERNAL MEDICINE

## 2025-03-27 PROCEDURE — 1159F MED LIST DOCD IN RCRD: CPT | Performed by: INTERNAL MEDICINE

## 2025-03-27 PROCEDURE — G0439 PPPS, SUBSEQ VISIT: HCPCS | Performed by: INTERNAL MEDICINE

## 2025-03-27 PROCEDURE — 3075F SYST BP GE 130 - 139MM HG: CPT | Performed by: INTERNAL MEDICINE

## 2025-03-27 NOTE — PATIENT INSTRUCTIONS
machines.)  Where would you prefer to die? (Your home? A hospital? A nursing home?)  Do you want to donate your organs when you die?  Do you want certain [employer] practices performed before you die?  When should you call for help?  Be sure to contact your doctor if you have any questions.  Where can you learn more?  Go to https://www.[employer].net/patientEd and enter R264 to learn more about \"Advance Directives: Care Instructions.\"  Current as of: March 1, 2024  Content Version: 14.4  © 4926-8524 [employer].   Care instructions adapted under license by [employer]. If you have questions about a medical condition or this instruction, always ask your healthcare professional. [employer], disclaims any warranty or liability for your use of this information.         A Healthy Heart: Care Instructions  Overview     Coronary artery disease, also called heart disease, occurs when a substance called plaque builds up in the vessels that supply oxygen-rich blood to your heart muscle. This can narrow the blood vessels and reduce blood flow. A heart attack happens when blood flow is completely blocked. A high-fat diet, smoking, and other factors increase the risk of heart disease.  Your doctor has found that you have a chance of having heart disease. A heart-healthy lifestyle can help keep your heart healthy and prevent heart disease. This lifestyle includes eating healthy, being active, staying at a weight that's healthy for you, and not smoking or using tobacco. It also includes taking medicines as directed, managing other health conditions, and trying to get a healthy amount of sleep.  Follow-up care is a key part of your treatment and safety. Be sure to make and go to all appointments, and call your doctor if you are having problems. It's also a good idea to know your test results and keep a list of the medicines you take.  How can you care for yourself at home?  Diet    Use less salt when you cook

## 2025-03-27 NOTE — PROGRESS NOTES
Medicare Annual Wellness Visit    Ovi Martins is here for Medicare AWV and Results (XR results and CBC)    Assessment & Plan   Medicare annual wellness visit, subsequent     No follow-ups on file.     Subjective   Neck Pain , MRI pending   HTN , BP okay   Smoking   Pt still smokes >10 pack yr   Strongly advised to quit,  pt will try /help offered  Counseling done for >10 mins       Patient's complete Health Risk Assessment and screening values have been reviewed and are found in Flowsheets. The following problems were reviewed today and where indicated follow up appointments were made and/or referrals ordered.    Positive Risk Factor Screenings with Interventions:    Fall Risk:  Do you feel unsteady or are you worried about falling? : (!) yes  2 or more falls in past year?: no  Fall with injury in past year?: no     Interventions:    Reviewed medications, home hazards, visual acuity, and co-morbidities that can increase risk for falls      Alcohol Screening:  AUDIT-C Score: (Patient-Rptd) 6  AUDIT Total Score: (Patient-Rptd) 11  Total Score Interpretation: 8-14 suggests harmful or hazardous alcohol consumption in men   Interventions:  Patient declined any further intervention or treatment        Drug Use:   Substance and Sexual Activity   Drug Use Yes    Types: Marijuana (Weed)    Comment: smoke     DAST-10 Score: (Patient-Rptd) 0     Interventions:  Trying to quit marijuana            Abnormal BMI (obese):  Body mass index is 30.88 kg/m². (!) Abnormal  Interventions:  low carbohydrate diet        Dentist Screen:  Have you seen the dentist within the past year?: (!) (Patient-Rptd) No    Intervention:  Advised to schedule with their dentist     Vision Screen:  Do you have difficulty driving, watching TV, or doing any of your daily activities because of your eyesight?: (Patient-Rptd) No  Have you had an eye exam within the past year?: (!) (Patient-Rptd) No  Interventions:   Patient encouraged to make appointment

## 2025-04-01 RX ORDER — GABAPENTIN 100 MG/1
100 CAPSULE ORAL 3 TIMES DAILY
Qty: 90 CAPSULE | Refills: 0 | Status: SHIPPED | OUTPATIENT
Start: 2025-04-01 | End: 2025-05-01

## 2025-04-18 ENCOUNTER — HOSPITAL ENCOUNTER (OUTPATIENT)
Dept: GENERAL RADIOLOGY | Age: 65
Discharge: HOME | End: 2025-04-20
Payer: COMMERCIAL

## 2025-04-18 ENCOUNTER — HOSPITAL ENCOUNTER (OUTPATIENT)
Age: 65
Discharge: HOME | End: 2025-04-20
Payer: COMMERCIAL

## 2025-04-18 ENCOUNTER — OFFICE VISIT (OUTPATIENT)
Dept: NEUROSURGERY | Age: 65
End: 2025-04-18
Payer: COMMERCIAL

## 2025-04-18 VITALS
SYSTOLIC BLOOD PRESSURE: 152 MMHG | WEIGHT: 214.8 LBS | HEIGHT: 70 IN | HEART RATE: 74 BPM | BODY MASS INDEX: 30.75 KG/M2 | DIASTOLIC BLOOD PRESSURE: 97 MMHG

## 2025-04-18 DIAGNOSIS — Z87.81 HISTORY OF CERVICAL FRACTURE: ICD-10-CM

## 2025-04-18 DIAGNOSIS — G99.2 STENOSIS OF CERVICAL SPINE WITH MYELOPATHY (HCC): ICD-10-CM

## 2025-04-18 DIAGNOSIS — M47.812 CERVICAL SPONDYLOSIS: ICD-10-CM

## 2025-04-18 DIAGNOSIS — M43.9 ACQUIRED DEFORMITIES OF SPINE: ICD-10-CM

## 2025-04-18 DIAGNOSIS — M48.02 STENOSIS OF CERVICAL SPINE WITH MYELOPATHY (HCC): ICD-10-CM

## 2025-04-18 DIAGNOSIS — G99.2 STENOSIS OF CERVICAL SPINE WITH MYELOPATHY (HCC): Primary | ICD-10-CM

## 2025-04-18 DIAGNOSIS — M48.02 STENOSIS OF CERVICAL SPINE WITH MYELOPATHY (HCC): Primary | ICD-10-CM

## 2025-04-18 PROCEDURE — 3080F DIAST BP >= 90 MM HG: CPT | Performed by: NURSE PRACTITIONER

## 2025-04-18 PROCEDURE — 3077F SYST BP >= 140 MM HG: CPT | Performed by: NURSE PRACTITIONER

## 2025-04-18 PROCEDURE — 1123F ACP DISCUSS/DSCN MKR DOCD: CPT | Performed by: NURSE PRACTITIONER

## 2025-04-18 PROCEDURE — 1160F RVW MEDS BY RX/DR IN RCRD: CPT | Performed by: NURSE PRACTITIONER

## 2025-04-18 PROCEDURE — 99214 OFFICE O/P EST MOD 30 MIN: CPT | Performed by: NURSE PRACTITIONER

## 2025-04-18 PROCEDURE — 1159F MED LIST DOCD IN RCRD: CPT | Performed by: NURSE PRACTITIONER

## 2025-04-18 PROCEDURE — 72082 X-RAY EXAM ENTIRE SPI 2/3 VW: CPT

## 2025-04-18 RX ORDER — MELOXICAM 15 MG/1
15 TABLET ORAL DAILY
Qty: 30 TABLET | Refills: 1 | Status: SHIPPED | OUTPATIENT
Start: 2025-04-18 | End: 2025-06-17
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
## 2025-05-01 DIAGNOSIS — J44.9 STAGE 2 MODERATE COPD BY GOLD CLASSIFICATION (HCC): ICD-10-CM

## 2025-05-01 RX ORDER — UMECLIDINIUM BROMIDE AND VILANTEROL TRIFENATATE 62.5; 25 UG/1; UG/1
1 POWDER RESPIRATORY (INHALATION) DAILY
Qty: 60 EACH | Refills: 0 | Status: SHIPPED | OUTPATIENT
Start: 2025-05-01

## 2025-05-06 ENCOUNTER — TELEPHONE (OUTPATIENT)
Dept: INTERNAL MEDICINE CLINIC | Age: 65
End: 2025-05-06

## 2025-05-12 ENCOUNTER — HOSPITAL ENCOUNTER (OUTPATIENT)
Dept: PAIN MANAGEMENT | Age: 65
Discharge: HOME | End: 2025-05-12
Payer: COMMERCIAL

## 2025-05-12 VITALS — HEIGHT: 70 IN | BODY MASS INDEX: 30.06 KG/M2 | WEIGHT: 210 LBS

## 2025-05-12 DIAGNOSIS — M50.30 DEGENERATIVE DISC DISEASE, CERVICAL: ICD-10-CM

## 2025-05-12 DIAGNOSIS — M79.18 MYOFASCIAL PAIN SYNDROME: ICD-10-CM

## 2025-05-12 DIAGNOSIS — M47.812 CERVICAL SPONDYLOSIS: Primary | ICD-10-CM

## 2025-05-12 PROCEDURE — 99203 OFFICE O/P NEW LOW 30 MIN: CPT

## 2025-05-12 PROCEDURE — 99204 OFFICE O/P NEW MOD 45 MIN: CPT | Performed by: STUDENT IN AN ORGANIZED HEALTH CARE EDUCATION/TRAINING PROGRAM

## 2025-05-12 RX ORDER — PREDNISONE 10 MG/1
10 TABLET ORAL DAILY
Qty: 10 TABLET | Refills: 0 | Status: SHIPPED | OUTPATIENT
Start: 2025-05-12 | End: 2025-05-22

## 2025-05-12 ASSESSMENT — PAIN SCALES - GENERAL: PAINLEVEL_OUTOF10: 8
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
## 2025-05-23 ENCOUNTER — TELEPHONE (OUTPATIENT)
Dept: PAIN MANAGEMENT | Age: 65
End: 2025-05-23

## 2025-05-23 DIAGNOSIS — I10 HYPERTENSION, ESSENTIAL: ICD-10-CM

## 2025-05-23 DIAGNOSIS — G62.1 ALCOHOL-INDUCED POLYNEUROPATHY: ICD-10-CM

## 2025-05-23 DIAGNOSIS — F18.10 ABUSE OF SMOKED SUBSTANCE (HCC): ICD-10-CM

## 2025-05-23 DIAGNOSIS — F10.20 ALCOHOLISM (HCC): ICD-10-CM

## 2025-05-23 RX ORDER — LOSARTAN POTASSIUM 50 MG/1
50 TABLET ORAL DAILY
Qty: 90 TABLET | Refills: 0 | Status: SHIPPED | OUTPATIENT
Start: 2025-05-23

## 2025-05-23 NOTE — TELEPHONE ENCOUNTER
Pt called office to cancel MBBs scheduled for 6/3 and 6/17. Pt reports he does not feel comfortable going through with procedures at this time. He does report the prednisone 10 mg prescribed seems to be helping and is requesting another refill if possible. LOV 5/12/25. Please advise.

## 2025-06-03 DIAGNOSIS — J44.9 STAGE 2 MODERATE COPD BY GOLD CLASSIFICATION (HCC): ICD-10-CM

## 2025-06-03 RX ORDER — UMECLIDINIUM BROMIDE AND VILANTEROL TRIFENATATE 62.5; 25 UG/1; UG/1
1 POWDER RESPIRATORY (INHALATION) DAILY
Qty: 3 EACH | Refills: 1 | Status: SHIPPED | OUTPATIENT
Start: 2025-06-03

## 2025-06-03 NOTE — TELEPHONE ENCOUNTER
LAST VISIT: 1/24/25  NEXT VISIT: 7/25/25    Per last dictation patient is on this medication. Please sign for refill if ok. Thank you.

## 2025-06-16 ENCOUNTER — HOSPITAL ENCOUNTER (OUTPATIENT)
Dept: PREADMISSION TESTING | Age: 65
Discharge: HOME | End: 2025-06-20

## 2025-06-16 VITALS — WEIGHT: 214 LBS | BODY MASS INDEX: 30.64 KG/M2 | HEIGHT: 70 IN

## 2025-06-16 NOTE — PROGRESS NOTES
pre-op holding area where you will be prepared for surgery.  A physical assessment will be performed by a nurse practitioner or house officer.  Your IV will be started and you will meet your anesthesiologist.    When you go to surgery, your family will be directed to the surgical waiting room, where the doctor should speak with them after your surgery.    After surgery, you will be taken to the recovery area.  When you are alert and stable, you will receive instructions and be prepared for discharge.     INSTRUCTIONS REVIEWED WITH ADOLFO, UNDERSTANDING VERBALIZED.  EGD AND COLONOSCOPY- 06/24/2025

## 2025-06-17 RX ORDER — PANTOPRAZOLE SODIUM 40 MG/1
TABLET, DELAYED RELEASE ORAL
Qty: 90 TABLET | Refills: 0 | Status: SHIPPED | OUTPATIENT
Start: 2025-06-17

## 2025-06-20 NOTE — PRE-PROCEDURE INSTRUCTIONS
Have you received your Prep? Any questions with prep instructions?   Only Clear Liquid Diet day before.  Nothing to eat after midnight day before procedure.  Are you taking any blood thinners? If so, you need to Stop.  Remove any jewelry and body piercings.  Do you wear glasses? If so, please bring a case to store them in.  Are you having any Covid symptoms?  Do you have any new rashes, infections, etc. that we should be aware of?  Do you have a ride home the day of surgery? It cannot be a cab or medical transportation.  Verify surgery time/date and what time to arrive at hospital.  NO ANSWER, VM LEFT TO ARRIVE AT 1100

## 2025-06-23 ENCOUNTER — ANESTHESIA EVENT (OUTPATIENT)
Dept: ENDOSCOPY | Age: 65
End: 2025-06-23
Payer: COMMERCIAL

## 2025-06-23 ASSESSMENT — ENCOUNTER SYMPTOMS
ABDOMINAL PAIN: 0
SINUS PRESSURE: 0
SHORTNESS OF BREATH: 0
NAUSEA: 0

## 2025-06-23 NOTE — H&P
HISTORY and PHYSICAL  [facility]       NAME:  Ovi Martins  MRN: [id number]   YOB: 1960   Date: 6/24/2025   Age: 64 y.o.  Gender: male       COMPLAINT AND PRESENT HISTORY:        Ovi Martins is 64 y.o.,   female, here for Procedure(s):  ESOPHAGOGASTRODUODENOSCOPY BIOPSY  COLORECTAL CANCER SCREENING, HIGH RISK    Pt is being seen for :  Pre-Op Diagnosis Codes:      * Ly's esophagus without dysplasia      * History of colon polyps     Last Colonoscopy and EGD done last in January 2025.      No  abdominal pain  no  dysphagia,  No  heartburn. Pt is on Protonix  No  nausea, vomiting, diarrhea, constipation.  No  blood in stool, dark tarry stools.  No  changes in appetite and unintended weight loss.  yes family history of colon cancer in his Father and maternal grandfather  Yes  hx of smoking.     Medical history reviewed. Htn, COPD with emphysema  Denies chest pain/pressure, palpitations, SOB, recent URI, fever or chills.   blood thinning medications:  Aleve, Mobic  Completed and followed prescribed prep.     Patient denies any personal or family problems with anesthesia.     RECENT LABS, IMAGING AND TESTING     Lab Results   Component Value Date    WBC 6.5 03/06/2025    RBC 4.32 03/06/2025    HGB 13.7 03/06/2025    HCT 41.6 03/06/2025    MCV 96.3 03/06/2025    MCH 31.7 03/06/2025    MCHC 32.9 03/06/2025    RDW 14.1 03/06/2025     03/06/2025    MPV 10.0 03/06/2025        Lab Results   Component Value Date     06/03/2024    K 4.2 06/03/2024     06/03/2024    CO2 22 06/03/2024    BUN 18 06/03/2024    CREATININE 0.9 06/03/2024    GLUCOSE 72 (L) 06/03/2024    CALCIUM 9.3 06/03/2024    BILITOT 0.4 06/03/2024    ALKPHOS 46 06/03/2024    AST 25 06/03/2024    ALT 16 06/03/2024       No results found.    PAST MEDICAL HISTORY     Past Medical History:   Diagnosis Date    COPD with emphysema (HCC)     Eczema     History of cervical fracture     Primary hypertension

## 2025-06-24 ENCOUNTER — HOSPITAL ENCOUNTER (OUTPATIENT)
Age: 65
Setting detail: OUTPATIENT SURGERY
Discharge: HOME | End: 2025-06-24
Attending: INTERNAL MEDICINE | Admitting: INTERNAL MEDICINE
Payer: COMMERCIAL

## 2025-06-24 ENCOUNTER — ANESTHESIA (OUTPATIENT)
Dept: ENDOSCOPY | Age: 65
End: 2025-06-24
Payer: COMMERCIAL

## 2025-06-24 VITALS
TEMPERATURE: 97.5 F | DIASTOLIC BLOOD PRESSURE: 90 MMHG | BODY MASS INDEX: 30.64 KG/M2 | WEIGHT: 214 LBS | HEART RATE: 63 BPM | SYSTOLIC BLOOD PRESSURE: 147 MMHG | OXYGEN SATURATION: 98 % | HEIGHT: 70 IN | RESPIRATION RATE: 21 BRPM

## 2025-06-24 DIAGNOSIS — Z86.0100 HISTORY OF COLON POLYPS: ICD-10-CM

## 2025-06-24 DIAGNOSIS — K22.70 BARRETT'S ESOPHAGUS WITHOUT DYSPLASIA: ICD-10-CM

## 2025-06-24 PROCEDURE — 43239 EGD BIOPSY SINGLE/MULTIPLE: CPT | Performed by: INTERNAL MEDICINE

## 2025-06-24 PROCEDURE — 45385 COLONOSCOPY W/LESION REMOVAL: CPT | Performed by: INTERNAL MEDICINE

## 2025-06-24 PROCEDURE — 7100000011 HC PHASE II RECOVERY - ADDTL 15 MIN: Performed by: INTERNAL MEDICINE

## 2025-06-24 PROCEDURE — C1889 IMPLANT/INSERT DEVICE, NOC: HCPCS | Performed by: INTERNAL MEDICINE

## 2025-06-24 PROCEDURE — 7100000010 HC PHASE II RECOVERY - FIRST 15 MIN: Performed by: INTERNAL MEDICINE

## 2025-06-24 PROCEDURE — 6360000002 HC RX W HCPCS: Performed by: NURSE ANESTHETIST, CERTIFIED REGISTERED

## 2025-06-24 PROCEDURE — 3700000000 HC ANESTHESIA ATTENDED CARE: Performed by: INTERNAL MEDICINE

## 2025-06-24 PROCEDURE — 3609012400 HC EGD TRANSORAL BIOPSY SINGLE/MULTIPLE: Performed by: INTERNAL MEDICINE

## 2025-06-24 PROCEDURE — 88305 TISSUE EXAM BY PATHOLOGIST: CPT

## 2025-06-24 PROCEDURE — 3609010600 HC COLONOSCOPY POLYPECTOMY SNARE/COLD BIOPSY: Performed by: INTERNAL MEDICINE

## 2025-06-24 PROCEDURE — 2709999900 HC NON-CHARGEABLE SUPPLY: Performed by: INTERNAL MEDICINE

## 2025-06-24 PROCEDURE — 3700000001 HC ADD 15 MINUTES (ANESTHESIA): Performed by: INTERNAL MEDICINE

## 2025-06-24 PROCEDURE — 6360000002 HC RX W HCPCS: Performed by: ANESTHESIOLOGY

## 2025-06-24 PROCEDURE — 45380 COLONOSCOPY AND BIOPSY: CPT | Performed by: INTERNAL MEDICINE

## 2025-06-24 DEVICE — WORKING LENGTH 235CM, WORKING CHANNEL 2.8MM
Type: IMPLANTABLE DEVICE | Site: ESOPHAGUS | Status: FUNCTIONAL
Brand: RESOLUTION 360 CLIP

## 2025-06-24 RX ORDER — SODIUM CHLORIDE 0.9 % (FLUSH) 0.9 %
5-40 SYRINGE (ML) INJECTION PRN
Status: DISCONTINUED | OUTPATIENT
Start: 2025-06-24 | End: 2025-06-24 | Stop reason: HOSPADM

## 2025-06-24 RX ORDER — PROPOFOL 10 MG/ML
INJECTION, EMULSION INTRAVENOUS
Status: DISCONTINUED | OUTPATIENT
Start: 2025-06-24 | End: 2025-06-24 | Stop reason: SDUPTHER

## 2025-06-24 RX ORDER — LIDOCAINE HYDROCHLORIDE 10 MG/ML
1 INJECTION, SOLUTION EPIDURAL; INFILTRATION; INTRACAUDAL; PERINEURAL
Status: COMPLETED | OUTPATIENT
Start: 2025-06-24 | End: 2025-06-24

## 2025-06-24 RX ORDER — SODIUM CHLORIDE 0.9 % (FLUSH) 0.9 %
5-40 SYRINGE (ML) INJECTION EVERY 12 HOURS SCHEDULED
Status: DISCONTINUED | OUTPATIENT
Start: 2025-06-24 | End: 2025-06-24 | Stop reason: HOSPADM

## 2025-06-24 RX ORDER — SODIUM CHLORIDE 9 MG/ML
INJECTION, SOLUTION INTRAVENOUS PRN
Status: DISCONTINUED | OUTPATIENT
Start: 2025-06-24 | End: 2025-06-24 | Stop reason: HOSPADM

## 2025-06-24 RX ORDER — SODIUM CHLORIDE, SODIUM LACTATE, POTASSIUM CHLORIDE, CALCIUM CHLORIDE 600; 310; 30; 20 MG/100ML; MG/100ML; MG/100ML; MG/100ML
INJECTION, SOLUTION INTRAVENOUS CONTINUOUS
Status: DISCONTINUED | OUTPATIENT
Start: 2025-06-24 | End: 2025-06-24 | Stop reason: HOSPADM

## 2025-06-24 RX ORDER — LIDOCAINE HYDROCHLORIDE 20 MG/ML
INJECTION, SOLUTION INFILTRATION; PERINEURAL
Status: DISCONTINUED | OUTPATIENT
Start: 2025-06-24 | End: 2025-06-24 | Stop reason: SDUPTHER

## 2025-06-24 RX ADMIN — LIDOCAINE HYDROCHLORIDE 1 ML: 10 INJECTION, SOLUTION EPIDURAL; INFILTRATION; INTRACAUDAL; PERINEURAL at 11:23

## 2025-06-24 RX ADMIN — PROPOFOL 100 MCG/KG/MIN: 10 INJECTION, EMULSION INTRAVENOUS at 13:14

## 2025-06-24 RX ADMIN — LIDOCAINE HYDROCHLORIDE 100 MG: 20 INJECTION, SOLUTION INFILTRATION; PERINEURAL at 13:14

## 2025-06-24 ASSESSMENT — LIFESTYLE VARIABLES: SMOKING_STATUS: 1

## 2025-06-24 ASSESSMENT — PAIN - FUNCTIONAL ASSESSMENT
PAIN_FUNCTIONAL_ASSESSMENT: 0-10
PAIN_FUNCTIONAL_ASSESSMENT: NONE - DENIES PAIN

## 2025-06-24 NOTE — ANESTHESIA PRE PROCEDURE
Department of Anesthesiology  Preprocedure Note       Name:  Ovi Martins   Age:  64 y.o.  :  1960                                          MRN:  [id number]         Date:  2025      Surgeon: Surgeon(s):  Yinka Garcia MD    Procedure: Procedure(s):  ESOPHAGOGASTRODUODENOSCOPY BIOPSY  COLORECTAL CANCER SCREENING, HIGH RISK    Medications prior to admission:   Prior to Admission medications    Medication Sig Start Date End Date Taking? Authorizing Provider   pantoprazole (PROTONIX) 40 MG tablet TAKE 1 TABLET BY MOUTH TWICE DAILY BEFORE MEAL(S) 25  Yes Yinka Garcia MD   ANORO ELLIPTA 62.5-25 MCG/ACT inhaler Inhale 1 puff by mouth once daily 6/3/25  Yes Tong Graff MD   losartan (COZAAR) 50 MG tablet Take 1 tablet by mouth once daily 25  Yes Eran Díaz MD   gabapentin (NEURONTIN) 100 MG capsule Take 1 capsule by mouth 3 times daily for 30 days. 25 Yes Eran Díaz MD   meloxicam (MOBIC) 15 MG tablet Take 1 tablet by mouth daily  Patient not taking: Reported on 2025  Kasie Jha, APRN - CNP   Naproxen Sodium (ALEVE PO) Take by mouth PRN  Patient not taking: Reported on 2025    ProviderNoni MD   Sodium Sulfate-Mag Sulfate-KCl (SUTAB) 0177-726-025 MG TABS Take as directed for bowel prep.  Patient not taking: Reported on 2025 3/14/25   Yinka Garcia MD   polyethylene glycol-electrolytes (NULYTELY) 420 g solution Take as directed for bowel prep.  Patient not taking: Reported on 2025   Yinka Garcia MD   sildenafil (REVATIO) 20 MG tablet Take 1 tablet by mouth daily as needed (erectile dysfunction) 24   Eran Díaz MD       Current medications:    Current Facility-Administered Medications   Medication Dose Route Frequency Provider Last Rate Last Admin   • lactated ringers infusion   IntraVENous Continuous Nuris Raya MD       • sodium chloride flush 0.9 % injection 5-40 mL  5-40 mL IntraVENous 2 times

## 2025-06-24 NOTE — OP NOTE
EGD report    Esophagogastroduodenoscopy (EGD) Procedure Note    Procedure:  EGD with Biopsies     Procedure Date: 6/24/2025    Indications:  Ly's esophagus, esophagitis    Sedation:  MAC    Attending Physician:  Dr. Yinka Garcia MD    Assistant:  None    Procedure Details:    Informed consent was obtained for the procedure, including sedation. Risks of infection, perforation, hemorrhage, adverse drug reaction, and aspiration were discussed. The patient was placed in the left lateral decubitus position. The patient was monitored continuously with ECG tracing, pulse oximetry, blood pressure monitoring, and direct observation.      The gastroscope was inserted into the mouth and advanced under direct vision to second portion of the duodenum.  A careful inspection was made as the gastroscope was withdrawn, including a retroflexed view of the proximal stomach; findings and interventions are described below. Appropriate photodocumentation was obtained.    Findings:  Retropharyngeal area was grossly normal appearing.  Some tortuosity vs diverticulum noted at UES.  The scope was maneuvered around this area     Esophagus: Irregular Z-line with abnormal salmon-colored mucosa extending above.  2 tongues extending 2 cm above.  Biopsies obtained from esophagus 37 and 39 cm respectively and four-quadrant                          Esophagogastric markings: Diaphragmatic hiatus- 42 cm; GE junction- 39 cm, a 2 to 3 cm hiatal hernia noted     Stomach:    Fundus: normal    Body: Erosion and erythema noted on the greater curvature and posterior body wall, biopsies obtained to rule out H. pylori    Antrum: normal     Duodenum:     Bulb: Erosions and erythema noted    First part: Normal    Second Part: Normal      Complications:  None           Estimated blood loss: Minimal    Disposition: Home           Condition: Stable    Specimen Removed: Stomach, esophagus 37 and 39 cm    Impression:    Possible tortuosity/diverticulum at

## 2025-06-24 NOTE — ANESTHESIA POSTPROCEDURE EVALUATION
Department of Anesthesiology  Postprocedure Note    Patient: Ovi Martins  MRN: [id number]  YOB: 1960  Date of evaluation: 6/24/2025    Procedure Summary       Date: 06/24/25 Room / Location: [place] [address] / [facility]    Anesthesia Start: 1310 Anesthesia Stop: 1400    Procedures:       ESOPHAGOGASTRODUODENOSCOPY BIOPSY (Esophagus)      COLONOSCOPY POLYPECTOMY SNARE/BIOPSY WITH RESOLUTION CLIP APPLICATION X1 AT SIGMOID (Rectum) Diagnosis:       Ly's esophagus without dysplasia      History of colon polyps      (Ly's esophagus without dysplasia [K22.70])      (History of colon polyps [Z86.0100])    Surgeons: Yinka Garcia MD Responsible Provider: Joceline Park MD    Anesthesia Type: General ASA Status: 2            Anesthesia Type: General    Juliano Phase I: Juliano Score: 10    Juliano Phase II: Juliano Score: 10    Anesthesia Post Evaluation    Comments: POST- ANESTHESIA EVALUATION       Pt Name: Ovi Martins  MRN: [id number]  YOB: 1960  Date of evaluation: 6/24/2025  Time:  3:21 PM      BP (!) 147/90   Pulse 63   Temp 97.5 °F (36.4 °C) (Infrared)   Resp 21   Ht 1.778 m (5' 10\")   Wt 97.1 kg (214 lb)   SpO2 98%   BMI 30.71 kg/m²      Consciousness Level  Awake  Cardiopulmonary Status  Stable  Pain Adequately Treated YES  Nausea / Vomiting  NO  Adequate Hydration  YES  Anesthesia Related Complications NONE      Electronically signed by Joceline Park MD on 6/24/2025 at 3:21 PM           No notable events documented.

## 2025-06-24 NOTE — OP NOTE
Colonoscopy report    Colonoscopy Procedure Note    Procedure:  Colonoscopy with polypectomy with snare and biopsy forcep, application of 1 Endo Clip    Procedure Date: 6/24/2025    Indications:  Hx colon polyp, last colonoscopy 6 months with large polyp removed with EMR technique    Sedation:  MAC    Attending Physician:  Dr. Yinka Garcia MD.     Assistant Physician: None    Consent:   Informed consent was obtained for the procedure after explaining the risks including bleeding, perforation, aspiration, arrhythmia, risks related to sedation, reaction to medications and rarely death, benefits and alternatives to the patient. The patient verbalized understanding and agreed to proceed with the procedure.       Procedure Details:  The patient was placed in the left lateral decubitus position.  Oxygen and cardiac monitoring equipment was attached. The patient's vital signs were monitored continuously  throughout the procedure. After appropriate sedation was achieved, a rectal examination was performed.  The pediatric colonoscope was inserted into the rectum and advanced under direct vision to the cecum, which was identified by the ileocecal valve and appendiceal orifice.  The quality of the colonic preparation was good.  A careful inspection was made as the colonoscope was withdrawn, including a retroflexed view of the rectum; findings and interventions are described below.  Appropriate photodocumentation was obtained.           Complications:  None    Estimated blood loss:  Minimal           Disposition:  Home           Condition: stable    Withdrawal Time: 18 minutes    Findings:   The bowel prep was good.  3 to 4 mm polyp noted in the ascending colon resected with cold snare.  unfortunately this polyp was not retrieved.  The previously resected EMR site was identified by the presence of scar tissue with an associated tattoo nearby in the transverse colon.  No obvious polyp recurrence noted.  2 polyps noted in the

## 2025-06-27 LAB — SURGICAL PATHOLOGY REPORT: NORMAL

## 2025-06-30 ENCOUNTER — PATIENT MESSAGE (OUTPATIENT)
Dept: GASTROENTEROLOGY | Age: 65
End: 2025-06-30

## 2025-07-09 ENCOUNTER — TELEPHONE (OUTPATIENT)
Dept: NEUROSURGERY | Age: 65
End: 2025-07-09

## 2025-07-23 ENCOUNTER — TELEPHONE (OUTPATIENT)
Dept: INTERNAL MEDICINE CLINIC | Age: 65
End: 2025-07-23

## 2025-07-23 NOTE — TELEPHONE ENCOUNTER
Patient called back and said he doesn't understand why they sent that message that he just seen them last month. Patient was told it must be an error.

## 2025-07-23 NOTE — TELEPHONE ENCOUNTER
Writer notified patient and left a VM in that the results from his EGD is showing  that the patient is to have Ly's Esophagus which means the cells have changed due to chronic over production of acid with Reflux and that patient will need to continue with a PPI and that our office has sent this out for a tissue cypher to see what precentage risk the cells would be with changing more over time. If any questions can call our office at [phone]. Please advise.

## 2025-07-23 NOTE — TELEPHONE ENCOUNTER
Patient left a voicemail stating his PCP office contacted him to relay that our office has been trying to reach him. Patient would like a call back.

## 2025-07-23 NOTE — TELEPHONE ENCOUNTER
LM for patient to call Gastroenterology to scheduled appt. Referral was placed back in December, and our office received a message that Gastro has made 3 attempts to contact patient to schedule and has been unsuccessful. Left number of [phone] for patient to call.

## 2025-08-01 RX ORDER — PANTOPRAZOLE SODIUM 40 MG/1
TABLET, DELAYED RELEASE ORAL
Qty: 90 TABLET | Refills: 0 | Status: SHIPPED | OUTPATIENT
Start: 2025-08-01

## 2025-08-05 ENCOUNTER — TELEPHONE (OUTPATIENT)
Dept: INTERNAL MEDICINE CLINIC | Age: 65
End: 2025-08-05

## 2025-08-13 ENCOUNTER — OFFICE VISIT (OUTPATIENT)
Dept: PULMONOLOGY | Age: 65
End: 2025-08-13
Payer: COMMERCIAL

## 2025-08-13 VITALS
OXYGEN SATURATION: 95 % | HEIGHT: 70 IN | SYSTOLIC BLOOD PRESSURE: 139 MMHG | RESPIRATION RATE: 19 BRPM | BODY MASS INDEX: 29.46 KG/M2 | DIASTOLIC BLOOD PRESSURE: 86 MMHG | HEART RATE: 76 BPM | WEIGHT: 205.8 LBS

## 2025-08-13 DIAGNOSIS — R91.8 MULTIPLE PULMONARY NODULES: ICD-10-CM

## 2025-08-13 DIAGNOSIS — Z87.891 PERSONAL HISTORY OF TOBACCO USE: ICD-10-CM

## 2025-08-13 DIAGNOSIS — J44.9 STAGE 2 MODERATE COPD BY GOLD CLASSIFICATION (HCC): Primary | ICD-10-CM

## 2025-08-13 DIAGNOSIS — J98.11 ROUND ATELECTASIS: ICD-10-CM

## 2025-08-13 PROCEDURE — 3075F SYST BP GE 130 - 139MM HG: CPT | Performed by: INTERNAL MEDICINE

## 2025-08-13 PROCEDURE — G0296 VISIT TO DETERM LDCT ELIG: HCPCS | Performed by: INTERNAL MEDICINE

## 2025-08-13 PROCEDURE — 1123F ACP DISCUSS/DSCN MKR DOCD: CPT | Performed by: INTERNAL MEDICINE

## 2025-08-13 PROCEDURE — 99407 BEHAV CHNG SMOKING > 10 MIN: CPT | Performed by: INTERNAL MEDICINE

## 2025-08-13 PROCEDURE — 3079F DIAST BP 80-89 MM HG: CPT | Performed by: INTERNAL MEDICINE

## 2025-08-13 PROCEDURE — 99214 OFFICE O/P EST MOD 30 MIN: CPT | Performed by: INTERNAL MEDICINE

## 2025-08-13 PROCEDURE — 1159F MED LIST DOCD IN RCRD: CPT | Performed by: INTERNAL MEDICINE

## 2025-08-13 RX ORDER — VARENICLINE TARTRATE 0.5 (11)-1
KIT ORAL
Qty: 1 EACH | Refills: 0 | Status: SHIPPED | OUTPATIENT
Start: 2025-08-13 | End: 2025-11-05

## 2025-08-25 DIAGNOSIS — F18.10 ABUSE OF SMOKED SUBSTANCE (HCC): ICD-10-CM

## 2025-08-25 DIAGNOSIS — F10.20 ALCOHOLISM (HCC): ICD-10-CM

## 2025-08-25 DIAGNOSIS — I10 HYPERTENSION, ESSENTIAL: ICD-10-CM

## 2025-08-25 DIAGNOSIS — G62.1 ALCOHOL-INDUCED POLYNEUROPATHY: ICD-10-CM

## 2025-08-26 RX ORDER — LOSARTAN POTASSIUM 50 MG/1
50 TABLET ORAL DAILY
Qty: 90 TABLET | Refills: 0 | Status: SHIPPED | OUTPATIENT
Start: 2025-08-26

## (undated) DEVICE — FORCEPS BX L240CM WRK CHN 2.8MM STD CAP W/ NDL MIC MESH

## (undated) DEVICE — NEEDLE SCLERO 25GA L240CM OD0.51MM ID0.24MM EXTN L4MM SHTH

## (undated) DEVICE — DEFENDO AIR WATER SUCTION AND BIOPSY VALVE KIT FOR  OLYMPUS: Brand: DEFENDO AIR/WATER/SUCTION AND BIOPSY VALVE

## (undated) DEVICE — GOWN,POLY REINFORCED,LG: Brand: MEDLINE

## (undated) DEVICE — ENDOSCOPIC KIT 1.1+ 10 FT OP4 CA DE

## (undated) DEVICE — SNARE ENDOSCP L240CM OD24MM LOOP W10MM RND INSUL STIFF BRAID

## (undated) DEVICE — ENDO KIT W/SYRINGE: Brand: MEDLINE INDUSTRIES, INC.

## (undated) DEVICE — GAUZE,SPONGE,4"X4",16PLY,STRL,LF,10/TRAY: Brand: MEDLINE

## (undated) DEVICE — Device: Brand: SPOT EX ENDOSCOPIC TATTOO

## (undated) DEVICE — BITEBLOCK 54FR W/ DENT RIM BLOX

## (undated) DEVICE — POLYP TRAP: Brand: TRAPEASE®

## (undated) DEVICE — ERBE NESSY® OMEGA PLATE USA (85+23)CM² , WITH CABLE 3 M: Brand: ERBE

## (undated) DEVICE — ELEVIEW SUBMUCOSAL INJECTABLE COMPOSITION 10ML

## (undated) DEVICE — SNARE ENDOSCP L240CM LOOP W13MM DIA2.4MM SHT THROW SM OVL

## (undated) DEVICE — SINGLE USE AIR/WATER, SUCTION AND BIOPSY VALVES SET: Brand: ORCAPOD™

## (undated) DEVICE — SNARE ENDOSCP L240CM OD24MM LOOP W15MM RND INSUL STIFF BRAID